# Patient Record
Sex: FEMALE | Race: WHITE | URBAN - METROPOLITAN AREA
[De-identification: names, ages, dates, MRNs, and addresses within clinical notes are randomized per-mention and may not be internally consistent; named-entity substitution may affect disease eponyms.]

---

## 2018-09-04 ENCOUNTER — APPOINTMENT (RX ONLY)
Dept: URBAN - METROPOLITAN AREA CLINIC 121 | Facility: CLINIC | Age: 83
Setting detail: DERMATOLOGY
End: 2018-09-04

## 2018-09-04 DIAGNOSIS — L57.0 ACTINIC KERATOSIS: ICD-10-CM

## 2018-09-04 DIAGNOSIS — Z71.89 OTHER SPECIFIED COUNSELING: ICD-10-CM

## 2018-09-04 DIAGNOSIS — D22 MELANOCYTIC NEVI: ICD-10-CM

## 2018-09-04 DIAGNOSIS — L82.1 OTHER SEBORRHEIC KERATOSIS: ICD-10-CM

## 2018-09-04 DIAGNOSIS — L72.0 EPIDERMAL CYST: ICD-10-CM

## 2018-09-04 PROBLEM — L29.8 OTHER PRURITUS: Status: ACTIVE | Noted: 2018-09-04

## 2018-09-04 PROBLEM — M12.9 ARTHROPATHY, UNSPECIFIED: Status: ACTIVE | Noted: 2018-09-04

## 2018-09-04 PROBLEM — D22.39 MELANOCYTIC NEVI OF OTHER PARTS OF FACE: Status: ACTIVE | Noted: 2018-09-04

## 2018-09-04 PROBLEM — E13.9 OTHER SPECIFIED DIABETES MELLITUS WITHOUT COMPLICATIONS: Status: ACTIVE | Noted: 2018-09-04

## 2018-09-04 PROBLEM — D22.4 MELANOCYTIC NEVI OF SCALP AND NECK: Status: ACTIVE | Noted: 2018-09-04

## 2018-09-04 PROCEDURE — ? COUNSELING

## 2018-09-04 PROCEDURE — ? DIAGNOSIS COMMENT

## 2018-09-04 PROCEDURE — 99202 OFFICE O/P NEW SF 15 MIN: CPT

## 2018-09-04 PROCEDURE — ? OTHER

## 2018-09-04 PROCEDURE — ? PRESCRIPTION

## 2018-09-04 RX ORDER — PHARMACY COMPOUNDING ACCESSORY
EACH MISCELLANEOUS
Qty: 30 | Refills: 0 | Status: ERX | COMMUNITY
Start: 2018-09-04

## 2018-09-04 RX ADMIN — Medication: at 13:53

## 2018-09-04 ASSESSMENT — LOCATION SIMPLE DESCRIPTION DERM
LOCATION SIMPLE: CHIN
LOCATION SIMPLE: RIGHT CLAVICULAR SKIN
LOCATION SIMPLE: RIGHT CHEEK
LOCATION SIMPLE: CHEST
LOCATION SIMPLE: LEFT ANTERIOR NECK

## 2018-09-04 ASSESSMENT — LOCATION DETAILED DESCRIPTION DERM
LOCATION DETAILED: LEFT SUPERIOR LATERAL NECK
LOCATION DETAILED: LEFT CHIN
LOCATION DETAILED: RIGHT CLAVICULAR SKIN
LOCATION DETAILED: RIGHT MEDIAL MALAR CHEEK
LOCATION DETAILED: LEFT LATERAL SUPERIOR CHEST

## 2018-09-04 ASSESSMENT — LOCATION ZONE DERM
LOCATION ZONE: TRUNK
LOCATION ZONE: NECK
LOCATION ZONE: FACE

## 2018-09-04 NOTE — PROCEDURE: DIAGNOSIS COMMENT
Detail Level: Simple
Comment: Pt was previously prescribed cephalexin 500MG by PCP pt states she has 1 pill left. Pt advised to finish medication.

## 2018-09-04 NOTE — PROCEDURE: OTHER
Note Text (......Xxx Chief Complaint.): This diagnosis correlates with the
Other (Free Text): 5FU handout was given.  9/4/18
Detail Level: Zone

## 2022-07-09 ENCOUNTER — TELEPHONE ENCOUNTER (OUTPATIENT)
Dept: URBAN - METROPOLITAN AREA CLINIC 121 | Facility: CLINIC | Age: 87
End: 2022-07-09

## 2022-07-10 ENCOUNTER — TELEPHONE ENCOUNTER (OUTPATIENT)
Dept: URBAN - METROPOLITAN AREA CLINIC 121 | Facility: CLINIC | Age: 87
End: 2022-07-10

## 2023-06-08 ENCOUNTER — TELEPHONE (OUTPATIENT)
Dept: PODIATRY | Facility: CLINIC | Age: 88
End: 2023-06-08
Payer: MEDICARE

## 2023-06-08 NOTE — TELEPHONE ENCOUNTER
Tried to contact patient the number is a non working number she doesn't have [facility] verbal release for me to call any other numbers. I am mailing a copy of the new appointment to the address listed in the chart.

## 2025-04-14 ENCOUNTER — APPOINTMENT (OUTPATIENT)
Dept: CT IMAGING | Facility: HOSPITAL | Age: OVER 89
DRG: 570 | End: 2025-04-14
Payer: MEDICARE

## 2025-04-14 ENCOUNTER — HOSPITAL ENCOUNTER (INPATIENT)
Facility: HOSPITAL | Age: OVER 89
LOS: 4 days | Discharge: NURSING, REHAB OR LONG-TERM CARE | DRG: 570 | End: 2025-04-19
Attending: EMERGENCY MEDICINE | Admitting: FAMILY MEDICINE
Payer: MEDICARE

## 2025-04-14 ENCOUNTER — APPOINTMENT (OUTPATIENT)
Dept: GENERAL RADIOLOGY | Facility: HOSPITAL | Age: OVER 89
DRG: 570 | End: 2025-04-14
Payer: MEDICARE

## 2025-04-14 DIAGNOSIS — Z74.09 IMPAIRED MOBILITY: ICD-10-CM

## 2025-04-14 DIAGNOSIS — L03.317 CELLULITIS OF BUTTOCK: Primary | ICD-10-CM

## 2025-04-14 DIAGNOSIS — S31.000A WOUND OF SACRAL REGION: ICD-10-CM

## 2025-04-14 DIAGNOSIS — I48.91 ATRIAL FIBRILLATION WITH RAPID VENTRICULAR RESPONSE: ICD-10-CM

## 2025-04-14 DIAGNOSIS — E87.1 HYPONATREMIA: ICD-10-CM

## 2025-04-14 LAB
ALBUMIN SERPL-MCNC: 3.8 G/DL (ref 3.5–5.2)
ALBUMIN/GLOB SERPL: 1.1 G/DL
ALP SERPL-CCNC: 88 U/L (ref 39–117)
ALT SERPL W P-5'-P-CCNC: 18 U/L (ref 1–33)
ANION GAP SERPL CALCULATED.3IONS-SCNC: 13 MMOL/L (ref 5–15)
AST SERPL-CCNC: 25 U/L (ref 1–32)
BACTERIA UR QL AUTO: ABNORMAL /HPF
BASOPHILS # BLD AUTO: 0.06 10*3/MM3 (ref 0–0.2)
BASOPHILS NFR BLD AUTO: 0.3 % (ref 0–1.5)
BILIRUB SERPL-MCNC: 0.7 MG/DL (ref 0–1.2)
BILIRUB UR QL STRIP: NEGATIVE
BUN SERPL-MCNC: 15 MG/DL (ref 8–23)
BUN/CREAT SERPL: 30.6 (ref 7–25)
CALCIUM SPEC-SCNC: 9.2 MG/DL (ref 8.2–9.6)
CHLORIDE SERPL-SCNC: 93 MMOL/L (ref 98–107)
CK SERPL-CCNC: 104 U/L (ref 20–180)
CLARITY UR: CLEAR
CO2 SERPL-SCNC: 23 MMOL/L (ref 22–29)
COLOR UR: YELLOW
CREAT SERPL-MCNC: 0.49 MG/DL (ref 0.57–1)
D-LACTATE SERPL-SCNC: 1.7 MMOL/L (ref 0.5–2)
DEPRECATED RDW RBC AUTO: 43.9 FL (ref 37–54)
EGFRCR SERPLBLD CKD-EPI 2021: 86.4 ML/MIN/1.73
EOSINOPHIL # BLD AUTO: 0.01 10*3/MM3 (ref 0–0.4)
EOSINOPHIL NFR BLD AUTO: 0.1 % (ref 0.3–6.2)
ERYTHROCYTE [DISTWIDTH] IN BLOOD BY AUTOMATED COUNT: 13.1 % (ref 12.3–15.4)
GLOBULIN UR ELPH-MCNC: 3.6 GM/DL
GLUCOSE SERPL-MCNC: 178 MG/DL (ref 65–99)
GLUCOSE UR STRIP-MCNC: NEGATIVE MG/DL
HCT VFR BLD AUTO: 42.7 % (ref 34–46.6)
HGB BLD-MCNC: 13.8 G/DL (ref 12–15.9)
HGB UR QL STRIP.AUTO: ABNORMAL
HOLD SPECIMEN: NORMAL
HYALINE CASTS UR QL AUTO: ABNORMAL /LPF
IMM GRANULOCYTES # BLD AUTO: 0.15 10*3/MM3 (ref 0–0.05)
IMM GRANULOCYTES NFR BLD AUTO: 0.8 % (ref 0–0.5)
INR PPP: 1.25 (ref 0.91–1.09)
KETONES UR QL STRIP: ABNORMAL
LEUKOCYTE ESTERASE UR QL STRIP.AUTO: NEGATIVE
LYMPHOCYTES # BLD AUTO: 2.29 10*3/MM3 (ref 0.7–3.1)
LYMPHOCYTES NFR BLD AUTO: 11.5 % (ref 19.6–45.3)
MCH RBC QN AUTO: 29.6 PG (ref 26.6–33)
MCHC RBC AUTO-ENTMCNC: 32.3 G/DL (ref 31.5–35.7)
MCV RBC AUTO: 91.4 FL (ref 79–97)
MONOCYTES # BLD AUTO: 1.23 10*3/MM3 (ref 0.1–0.9)
MONOCYTES NFR BLD AUTO: 6.2 % (ref 5–12)
NEUTROPHILS NFR BLD AUTO: 16.13 10*3/MM3 (ref 1.7–7)
NEUTROPHILS NFR BLD AUTO: 81.1 % (ref 42.7–76)
NITRITE UR QL STRIP: NEGATIVE
NRBC BLD AUTO-RTO: 0 /100 WBC (ref 0–0.2)
NT-PROBNP SERPL-MCNC: 3486 PG/ML (ref 0–1800)
PH UR STRIP.AUTO: 7 [PH] (ref 5–8)
PLATELET # BLD AUTO: 181 10*3/MM3 (ref 140–450)
PMV BLD AUTO: 9.8 FL (ref 6–12)
POTASSIUM SERPL-SCNC: 4.6 MMOL/L (ref 3.5–5.2)
PROT SERPL-MCNC: 7.4 G/DL (ref 6–8.5)
PROT UR QL STRIP: ABNORMAL
PROTHROMBIN TIME: 16.3 SECONDS (ref 11.8–14.8)
RBC # BLD AUTO: 4.67 10*6/MM3 (ref 3.77–5.28)
RBC # UR STRIP: ABNORMAL /HPF
REF LAB TEST METHOD: ABNORMAL
SODIUM SERPL-SCNC: 129 MMOL/L (ref 136–145)
SP GR UR STRIP: 1.02 (ref 1–1.03)
SQUAMOUS #/AREA URNS HPF: ABNORMAL /HPF
UROBILINOGEN UR QL STRIP: ABNORMAL
WBC # UR STRIP: ABNORMAL /HPF
WBC NRBC COR # BLD AUTO: 19.87 10*3/MM3 (ref 3.4–10.8)
WHOLE BLOOD HOLD COAG: NORMAL
WHOLE BLOOD HOLD SPECIMEN: NORMAL

## 2025-04-14 PROCEDURE — 93005 ELECTROCARDIOGRAM TRACING: CPT | Performed by: EMERGENCY MEDICINE

## 2025-04-14 PROCEDURE — 82550 ASSAY OF CK (CPK): CPT | Performed by: EMERGENCY MEDICINE

## 2025-04-14 PROCEDURE — 25010000002 PIPERACILLIN SOD-TAZOBACTAM PER 1 G: Performed by: EMERGENCY MEDICINE

## 2025-04-14 PROCEDURE — P9612 CATHETERIZE FOR URINE SPEC: HCPCS

## 2025-04-14 PROCEDURE — 72125 CT NECK SPINE W/O DYE: CPT

## 2025-04-14 PROCEDURE — 85610 PROTHROMBIN TIME: CPT | Performed by: EMERGENCY MEDICINE

## 2025-04-14 PROCEDURE — 83880 ASSAY OF NATRIURETIC PEPTIDE: CPT | Performed by: EMERGENCY MEDICINE

## 2025-04-14 PROCEDURE — 70450 CT HEAD/BRAIN W/O DYE: CPT

## 2025-04-14 PROCEDURE — 36415 COLL VENOUS BLD VENIPUNCTURE: CPT

## 2025-04-14 PROCEDURE — 25810000003 SODIUM CHLORIDE 0.9 % SOLUTION: Performed by: EMERGENCY MEDICINE

## 2025-04-14 PROCEDURE — 85025 COMPLETE CBC W/AUTO DIFF WBC: CPT | Performed by: EMERGENCY MEDICINE

## 2025-04-14 PROCEDURE — 99285 EMERGENCY DEPT VISIT HI MDM: CPT

## 2025-04-14 PROCEDURE — 81001 URINALYSIS AUTO W/SCOPE: CPT | Performed by: EMERGENCY MEDICINE

## 2025-04-14 PROCEDURE — 25010000002 MORPHINE PER 10 MG: Performed by: EMERGENCY MEDICINE

## 2025-04-14 PROCEDURE — 87040 BLOOD CULTURE FOR BACTERIA: CPT | Performed by: EMERGENCY MEDICINE

## 2025-04-14 PROCEDURE — 86140 C-REACTIVE PROTEIN: CPT

## 2025-04-14 PROCEDURE — 71045 X-RAY EXAM CHEST 1 VIEW: CPT

## 2025-04-14 PROCEDURE — 83605 ASSAY OF LACTIC ACID: CPT | Performed by: EMERGENCY MEDICINE

## 2025-04-14 PROCEDURE — 93010 ELECTROCARDIOGRAM REPORT: CPT | Performed by: INTERNAL MEDICINE

## 2025-04-14 PROCEDURE — 80053 COMPREHEN METABOLIC PANEL: CPT | Performed by: EMERGENCY MEDICINE

## 2025-04-14 RX ORDER — CITALOPRAM HYDROBROMIDE 10 MG/1
10 TABLET ORAL DAILY
COMMUNITY

## 2025-04-14 RX ORDER — SODIUM CHLORIDE 0.9 % (FLUSH) 0.9 %
10 SYRINGE (ML) INJECTION AS NEEDED
Status: DISCONTINUED | OUTPATIENT
Start: 2025-04-14 | End: 2025-04-18

## 2025-04-14 RX ORDER — TORSEMIDE 10 MG/1
5 TABLET ORAL DAILY
COMMUNITY

## 2025-04-14 RX ORDER — CLONIDINE HYDROCHLORIDE 0.2 MG/1
0.2 TABLET ORAL
COMMUNITY
End: 2025-04-19 | Stop reason: HOSPADM

## 2025-04-14 RX ORDER — TRAMADOL HYDROCHLORIDE 50 MG/1
50 TABLET ORAL EVERY 6 HOURS PRN
Status: ON HOLD | COMMUNITY
End: 2025-04-19

## 2025-04-14 RX ORDER — DORZOLAMIDE HYDROCHLORIDE AND TIMOLOL MALEATE 20; 5 MG/ML; MG/ML
1 SOLUTION/ DROPS OPHTHALMIC 2 TIMES DAILY
COMMUNITY

## 2025-04-14 RX ORDER — LOSARTAN POTASSIUM 50 MG/1
50 TABLET ORAL DAILY
COMMUNITY

## 2025-04-14 RX ADMIN — SODIUM CHLORIDE 2409 ML: 9 INJECTION, SOLUTION INTRAVENOUS at 23:29

## 2025-04-14 RX ADMIN — PIPERACILLIN AND TAZOBACTAM 3.38 G: 3; .375 INJECTION, POWDER, FOR SOLUTION INTRAVENOUS; PARENTERAL at 23:40

## 2025-04-14 RX ADMIN — MORPHINE SULFATE 4 MG: 4 INJECTION, SOLUTION INTRAMUSCULAR; INTRAVENOUS at 23:28

## 2025-04-14 NOTE — Clinical Note
Level of Care: Remote Telemetry [26]   Diagnosis: Cellulitis [[id number]]   Admitting Physician: NAIN MENDEZ [[id number]]   Attending Physician: NAIN MENDEZ [[id number]]   Certification: I Certify That Inpatient Hospital Services Are Medically Necessary For Greater Than 2 Midnights

## 2025-04-15 ENCOUNTER — APPOINTMENT (OUTPATIENT)
Dept: CARDIOLOGY | Facility: HOSPITAL | Age: OVER 89
DRG: 570 | End: 2025-04-15
Payer: MEDICARE

## 2025-04-15 ENCOUNTER — ANESTHESIA EVENT (OUTPATIENT)
Dept: PERIOP | Facility: HOSPITAL | Age: OVER 89
End: 2025-04-15
Payer: MEDICARE

## 2025-04-15 ENCOUNTER — ANESTHESIA (OUTPATIENT)
Dept: PERIOP | Facility: HOSPITAL | Age: OVER 89
End: 2025-04-15
Payer: MEDICARE

## 2025-04-15 PROBLEM — L03.90 CELLULITIS: Status: ACTIVE | Noted: 2025-04-15

## 2025-04-15 LAB
ANION GAP SERPL CALCULATED.3IONS-SCNC: 13 MMOL/L (ref 5–15)
AORTIC DIMENSIONLESS INDEX: 0.38 (DI)
AV MEAN PRESS GRAD SYS DOP V1V2: 4.6 MMHG
AV VMAX SYS DOP: 144.8 CM/SEC
BH CV ECHO MEAS - AO MAX PG: 8.4 MMHG
BH CV ECHO MEAS - AO ROOT DIAM: 3.6 CM
BH CV ECHO MEAS - AO V2 VTI: 26.2 CM
BH CV ECHO MEAS - AVA(I,D): 1.41 CM2
BH CV ECHO MEAS - EDV(CUBED): 88.4 ML
BH CV ECHO MEAS - EDV(MOD-SP4): 66.7 ML
BH CV ECHO MEAS - EF(MOD-SP4): 59.1 %
BH CV ECHO MEAS - ESV(CUBED): 35.3 ML
BH CV ECHO MEAS - ESV(MOD-SP4): 27.3 ML
BH CV ECHO MEAS - FS: 26.3 %
BH CV ECHO MEAS - IVS/LVPW: 1.22 CM
BH CV ECHO MEAS - IVSD: 1.08 CM
BH CV ECHO MEAS - LA DIMENSION: 3.5 CM
BH CV ECHO MEAS - LAT PEAK E' VEL: 9.3 CM/SEC
BH CV ECHO MEAS - LV DIASTOLIC VOL/BSA (35-75): 35 CM2
BH CV ECHO MEAS - LV MASS(C)D: 146.4 GRAMS
BH CV ECHO MEAS - LV MAX PG: 1.37 MMHG
BH CV ECHO MEAS - LV MEAN PG: 0.66 MMHG
BH CV ECHO MEAS - LV SYSTOLIC VOL/BSA (12-30): 14.3 CM2
BH CV ECHO MEAS - LV V1 MAX: 58.3 CM/SEC
BH CV ECHO MEAS - LV V1 VTI: 10 CM
BH CV ECHO MEAS - LVIDD: 4.5 CM
BH CV ECHO MEAS - LVIDS: 3.3 CM
BH CV ECHO MEAS - LVOT AREA: 3.7 CM2
BH CV ECHO MEAS - LVOT DIAM: 2.17 CM
BH CV ECHO MEAS - LVPWD: 0.88 CM
BH CV ECHO MEAS - MED PEAK E' VEL: 7.7 CM/SEC
BH CV ECHO MEAS - MV DEC SLOPE: 951.9 CM/SEC2
BH CV ECHO MEAS - MV DEC TIME: 0.12 SEC
BH CV ECHO MEAS - MV E MAX VEL: 115.5 CM/SEC
BH CV ECHO MEAS - PA V2 MAX: 95.7 CM/SEC
BH CV ECHO MEAS - RAP SYSTOLE: 3 MMHG
BH CV ECHO MEAS - RVDD: 3.3 CM
BH CV ECHO MEAS - RVSP: 37.8 MMHG
BH CV ECHO MEAS - SV(LVOT): 37 ML
BH CV ECHO MEAS - SV(MOD-SP4): 39.4 ML
BH CV ECHO MEAS - SVI(LVOT): 19.4 ML/M2
BH CV ECHO MEAS - SVI(MOD-SP4): 20.7 ML/M2
BH CV ECHO MEAS - TAPSE (>1.6): 1.44 CM
BH CV ECHO MEAS - TR MAX PG: 34.8 MMHG
BH CV ECHO MEAS - TR MAX VEL: 295 CM/SEC
BH CV ECHO MEASUREMENTS AVERAGE E/E' RATIO: 13.59
BUN SERPL-MCNC: 13 MG/DL (ref 8–23)
BUN/CREAT SERPL: 31 (ref 7–25)
CALCIUM SPEC-SCNC: 8.7 MG/DL (ref 8.2–9.6)
CHLORIDE SERPL-SCNC: 98 MMOL/L (ref 98–107)
CO2 SERPL-SCNC: 23 MMOL/L (ref 22–29)
CREAT SERPL-MCNC: 0.42 MG/DL (ref 0.57–1)
CRP SERPL-MCNC: 14.37 MG/DL (ref 0–0.5)
DEPRECATED RDW RBC AUTO: 45 FL (ref 37–54)
EGFRCR SERPLBLD CKD-EPI 2021: 89.6 ML/MIN/1.73
ERYTHROCYTE [DISTWIDTH] IN BLOOD BY AUTOMATED COUNT: 13.2 % (ref 12.3–15.4)
ERYTHROCYTE [SEDIMENTATION RATE] IN BLOOD: 27 MM/HR (ref 0–30)
GLUCOSE BLDC GLUCOMTR-MCNC: 179 MG/DL (ref 70–130)
GLUCOSE SERPL-MCNC: 156 MG/DL (ref 65–99)
HCT VFR BLD AUTO: 40.1 % (ref 34–46.6)
HGB BLD-MCNC: 12.8 G/DL (ref 12–15.9)
MAGNESIUM SERPL-MCNC: 1.8 MG/DL (ref 1.7–2.3)
MCH RBC QN AUTO: 29.8 PG (ref 26.6–33)
MCHC RBC AUTO-ENTMCNC: 31.9 G/DL (ref 31.5–35.7)
MCV RBC AUTO: 93.3 FL (ref 79–97)
MRSA DNA SPEC QL NAA+PROBE: NORMAL
PLATELET # BLD AUTO: 163 10*3/MM3 (ref 140–450)
PMV BLD AUTO: 10.4 FL (ref 6–12)
POTASSIUM SERPL-SCNC: 3.6 MMOL/L (ref 3.5–5.2)
RBC # BLD AUTO: 4.3 10*6/MM3 (ref 3.77–5.28)
SODIUM SERPL-SCNC: 134 MMOL/L (ref 136–145)
TSH SERPL DL<=0.05 MIU/L-ACNC: 0.83 UIU/ML (ref 0.27–4.2)
WBC NRBC COR # BLD AUTO: 19.16 10*3/MM3 (ref 3.4–10.8)

## 2025-04-15 PROCEDURE — 25010000002 PIPERACILLIN SOD-TAZOBACTAM PER 1 G

## 2025-04-15 PROCEDURE — 25810000003 LACTATED RINGERS PER 1000 ML: Performed by: GENERAL PRACTICE

## 2025-04-15 PROCEDURE — 25010000002 LIDOCAINE PF 2% 2 % SOLUTION: Performed by: NURSE ANESTHETIST, CERTIFIED REGISTERED

## 2025-04-15 PROCEDURE — 93306 TTE W/DOPPLER COMPLETE: CPT | Performed by: INTERNAL MEDICINE

## 2025-04-15 PROCEDURE — 25010000002 FUROSEMIDE PER 20 MG: Performed by: NURSE PRACTITIONER

## 2025-04-15 PROCEDURE — 36415 COLL VENOUS BLD VENIPUNCTURE: CPT

## 2025-04-15 PROCEDURE — 93356 MYOCRD STRAIN IMG SPCKL TRCK: CPT

## 2025-04-15 PROCEDURE — 87641 MR-STAPH DNA AMP PROBE: CPT

## 2025-04-15 PROCEDURE — 80048 BASIC METABOLIC PNL TOTAL CA: CPT

## 2025-04-15 PROCEDURE — 25010000002 PROPOFOL 10 MG/ML EMULSION: Performed by: NURSE ANESTHETIST, CERTIFIED REGISTERED

## 2025-04-15 PROCEDURE — 87102 FUNGUS ISOLATION CULTURE: CPT | Performed by: GENERAL PRACTICE

## 2025-04-15 PROCEDURE — 93306 TTE W/DOPPLER COMPLETE: CPT

## 2025-04-15 PROCEDURE — 25010000002 LIDOCAINE 1% - EPINEPHRINE 1:100000 1 %-1:100000 SOLUTION: Performed by: GENERAL PRACTICE

## 2025-04-15 PROCEDURE — 85652 RBC SED RATE AUTOMATED: CPT

## 2025-04-15 PROCEDURE — 25010000002 FUROSEMIDE PER 20 MG: Performed by: GENERAL PRACTICE

## 2025-04-15 PROCEDURE — 11042 DBRDMT SUBQ TIS 1ST 20SQCM/<: CPT | Performed by: GENERAL PRACTICE

## 2025-04-15 PROCEDURE — 25010000002 FUROSEMIDE PER 20 MG: Performed by: INTERNAL MEDICINE

## 2025-04-15 PROCEDURE — 25010000002 METHYLPREDNISOLONE PER 125 MG: Performed by: INTERNAL MEDICINE

## 2025-04-15 PROCEDURE — 83735 ASSAY OF MAGNESIUM: CPT | Performed by: NURSE PRACTITIONER

## 2025-04-15 PROCEDURE — 82948 REAGENT STRIP/BLOOD GLUCOSE: CPT

## 2025-04-15 PROCEDURE — 85027 COMPLETE CBC AUTOMATED: CPT

## 2025-04-15 PROCEDURE — 25010000002 ENOXAPARIN PER 10 MG: Performed by: INTERNAL MEDICINE

## 2025-04-15 PROCEDURE — 25010000002 CEFAZOLIN PER 500 MG: Performed by: GENERAL PRACTICE

## 2025-04-15 PROCEDURE — 87070 CULTURE OTHR SPECIMN AEROBIC: CPT | Performed by: GENERAL PRACTICE

## 2025-04-15 PROCEDURE — 87075 CULTR BACTERIA EXCEPT BLOOD: CPT | Performed by: GENERAL PRACTICE

## 2025-04-15 PROCEDURE — 99221 1ST HOSP IP/OBS SF/LOW 40: CPT | Performed by: GENERAL PRACTICE

## 2025-04-15 PROCEDURE — 87186 SC STD MICRODIL/AGAR DIL: CPT | Performed by: GENERAL PRACTICE

## 2025-04-15 PROCEDURE — 87205 SMEAR GRAM STAIN: CPT | Performed by: GENERAL PRACTICE

## 2025-04-15 PROCEDURE — 25010000002 CEFAZOLIN PER 500 MG: Performed by: INTERNAL MEDICINE

## 2025-04-15 PROCEDURE — 0JB90ZZ EXCISION OF BUTTOCK SUBCUTANEOUS TISSUE AND FASCIA, OPEN APPROACH: ICD-10-PCS | Performed by: GENERAL PRACTICE

## 2025-04-15 PROCEDURE — 84443 ASSAY THYROID STIM HORMONE: CPT

## 2025-04-15 PROCEDURE — 99222 1ST HOSP IP/OBS MODERATE 55: CPT | Performed by: NURSE PRACTITIONER

## 2025-04-15 RX ORDER — SODIUM CHLORIDE 0.9 % (FLUSH) 0.9 %
10 SYRINGE (ML) INJECTION EVERY 12 HOURS SCHEDULED
Status: DISCONTINUED | OUTPATIENT
Start: 2025-04-15 | End: 2025-04-19 | Stop reason: HOSPADM

## 2025-04-15 RX ORDER — SODIUM CHLORIDE, SODIUM LACTATE, POTASSIUM CHLORIDE, CALCIUM CHLORIDE 600; 310; 30; 20 MG/100ML; MG/100ML; MG/100ML; MG/100ML
20 INJECTION, SOLUTION INTRAVENOUS CONTINUOUS
Status: DISCONTINUED | OUTPATIENT
Start: 2025-04-15 | End: 2025-04-15

## 2025-04-15 RX ORDER — HEPARIN SODIUM 5000 [USP'U]/ML
5000 INJECTION, SOLUTION INTRAVENOUS; SUBCUTANEOUS EVERY 12 HOURS SCHEDULED
Status: CANCELLED | OUTPATIENT
Start: 2025-04-15

## 2025-04-15 RX ORDER — ACETAMINOPHEN 160 MG/5ML
650 SOLUTION ORAL EVERY 4 HOURS PRN
Status: DISCONTINUED | OUTPATIENT
Start: 2025-04-15 | End: 2025-04-19 | Stop reason: HOSPADM

## 2025-04-15 RX ORDER — HYDROMORPHONE HYDROCHLORIDE 1 MG/ML
0.5 INJECTION, SOLUTION INTRAMUSCULAR; INTRAVENOUS; SUBCUTANEOUS
Status: DISCONTINUED | OUTPATIENT
Start: 2025-04-15 | End: 2025-04-15 | Stop reason: HOSPADM

## 2025-04-15 RX ORDER — SODIUM CHLORIDE 9 MG/ML
40 INJECTION, SOLUTION INTRAVENOUS AS NEEDED
Status: DISCONTINUED | OUTPATIENT
Start: 2025-04-15 | End: 2025-04-19 | Stop reason: HOSPADM

## 2025-04-15 RX ORDER — PROPOFOL 10 MG/ML
VIAL (ML) INTRAVENOUS AS NEEDED
Status: DISCONTINUED | OUTPATIENT
Start: 2025-04-15 | End: 2025-04-15 | Stop reason: SURG

## 2025-04-15 RX ORDER — OXYCODONE AND ACETAMINOPHEN 10; 325 MG/1; MG/1
1 TABLET ORAL EVERY 4 HOURS PRN
Status: DISCONTINUED | OUTPATIENT
Start: 2025-04-15 | End: 2025-04-15 | Stop reason: HOSPADM

## 2025-04-15 RX ORDER — FENTANYL CITRATE 50 UG/ML
50 INJECTION, SOLUTION INTRAMUSCULAR; INTRAVENOUS
Status: DISCONTINUED | OUTPATIENT
Start: 2025-04-15 | End: 2025-04-15 | Stop reason: HOSPADM

## 2025-04-15 RX ORDER — NALOXONE HCL 0.4 MG/ML
0.04 VIAL (ML) INJECTION AS NEEDED
Status: DISCONTINUED | OUTPATIENT
Start: 2025-04-15 | End: 2025-04-15 | Stop reason: HOSPADM

## 2025-04-15 RX ORDER — METOPROLOL TARTRATE 1 MG/ML
5 INJECTION, SOLUTION INTRAVENOUS ONCE AS NEEDED
Status: COMPLETED | OUTPATIENT
Start: 2025-04-15 | End: 2025-04-15

## 2025-04-15 RX ORDER — ACETAMINOPHEN 650 MG/1
650 SUPPOSITORY RECTAL EVERY 4 HOURS PRN
Status: DISCONTINUED | OUTPATIENT
Start: 2025-04-15 | End: 2025-04-19 | Stop reason: HOSPADM

## 2025-04-15 RX ORDER — MAGNESIUM HYDROXIDE 1200 MG/15ML
LIQUID ORAL AS NEEDED
Status: DISCONTINUED | OUTPATIENT
Start: 2025-04-15 | End: 2025-04-15 | Stop reason: HOSPADM

## 2025-04-15 RX ORDER — METHYLPREDNISOLONE SODIUM SUCCINATE 125 MG/2ML
125 INJECTION, POWDER, LYOPHILIZED, FOR SOLUTION INTRAMUSCULAR; INTRAVENOUS ONCE
Status: COMPLETED | OUTPATIENT
Start: 2025-04-15 | End: 2025-04-15

## 2025-04-15 RX ORDER — BISACODYL 5 MG/1
5 TABLET, DELAYED RELEASE ORAL DAILY PRN
Status: DISCONTINUED | OUTPATIENT
Start: 2025-04-15 | End: 2025-04-19 | Stop reason: HOSPADM

## 2025-04-15 RX ORDER — LOSARTAN POTASSIUM 25 MG/1
25 TABLET ORAL
Status: DISCONTINUED | OUTPATIENT
Start: 2025-04-15 | End: 2025-04-16

## 2025-04-15 RX ORDER — METOPROLOL TARTRATE 1 MG/ML
5 INJECTION, SOLUTION INTRAVENOUS ONCE
Status: COMPLETED | OUTPATIENT
Start: 2025-04-15 | End: 2025-04-15

## 2025-04-15 RX ORDER — ENOXAPARIN SODIUM 100 MG/ML
40 INJECTION SUBCUTANEOUS EVERY 24 HOURS
Status: DISCONTINUED | OUTPATIENT
Start: 2025-04-15 | End: 2025-04-19 | Stop reason: HOSPADM

## 2025-04-15 RX ORDER — NITROGLYCERIN 0.4 MG/1
0.4 TABLET SUBLINGUAL
Status: DISCONTINUED | OUTPATIENT
Start: 2025-04-15 | End: 2025-04-19 | Stop reason: HOSPADM

## 2025-04-15 RX ORDER — FUROSEMIDE 10 MG/ML
40 INJECTION INTRAMUSCULAR; INTRAVENOUS
Status: DISCONTINUED | OUTPATIENT
Start: 2025-04-15 | End: 2025-04-16

## 2025-04-15 RX ORDER — SODIUM CHLORIDE 0.9 % (FLUSH) 0.9 %
10 SYRINGE (ML) INJECTION AS NEEDED
Status: DISCONTINUED | OUTPATIENT
Start: 2025-04-15 | End: 2025-04-19 | Stop reason: HOSPADM

## 2025-04-15 RX ORDER — POLYETHYLENE GLYCOL 3350 17 G/17G
17 POWDER, FOR SOLUTION ORAL DAILY PRN
Status: DISCONTINUED | OUTPATIENT
Start: 2025-04-15 | End: 2025-04-19 | Stop reason: HOSPADM

## 2025-04-15 RX ORDER — LIDOCAINE HYDROCHLORIDE AND EPINEPHRINE 10; 10 MG/ML; UG/ML
INJECTION, SOLUTION INFILTRATION; PERINEURAL AS NEEDED
Status: DISCONTINUED | OUTPATIENT
Start: 2025-04-15 | End: 2025-04-15 | Stop reason: HOSPADM

## 2025-04-15 RX ORDER — AMOXICILLIN 250 MG
2 CAPSULE ORAL 2 TIMES DAILY PRN
Status: DISCONTINUED | OUTPATIENT
Start: 2025-04-15 | End: 2025-04-19 | Stop reason: HOSPADM

## 2025-04-15 RX ORDER — SODIUM CHLORIDE 9 MG/ML
100 INJECTION, SOLUTION INTRAVENOUS CONTINUOUS
Status: DISCONTINUED | OUTPATIENT
Start: 2025-04-15 | End: 2025-04-15

## 2025-04-15 RX ORDER — BISACODYL 10 MG
10 SUPPOSITORY, RECTAL RECTAL DAILY PRN
Status: DISCONTINUED | OUTPATIENT
Start: 2025-04-15 | End: 2025-04-19 | Stop reason: HOSPADM

## 2025-04-15 RX ORDER — METOPROLOL TARTRATE 25 MG/1
12.5 TABLET, FILM COATED ORAL EVERY 12 HOURS SCHEDULED
Status: DISCONTINUED | OUTPATIENT
Start: 2025-04-15 | End: 2025-04-16

## 2025-04-15 RX ORDER — FUROSEMIDE 10 MG/ML
40 INJECTION INTRAMUSCULAR; INTRAVENOUS ONCE
Status: DISCONTINUED | OUTPATIENT
Start: 2025-04-15 | End: 2025-04-15

## 2025-04-15 RX ORDER — LIDOCAINE HYDROCHLORIDE 20 MG/ML
INJECTION, SOLUTION EPIDURAL; INFILTRATION; INTRACAUDAL; PERINEURAL AS NEEDED
Status: DISCONTINUED | OUTPATIENT
Start: 2025-04-15 | End: 2025-04-15 | Stop reason: SURG

## 2025-04-15 RX ORDER — FLUMAZENIL 0.1 MG/ML
0.2 INJECTION INTRAVENOUS AS NEEDED
Status: DISCONTINUED | OUTPATIENT
Start: 2025-04-15 | End: 2025-04-15 | Stop reason: HOSPADM

## 2025-04-15 RX ORDER — CITALOPRAM HYDROBROMIDE 20 MG/1
10 TABLET ORAL DAILY
Status: DISCONTINUED | OUTPATIENT
Start: 2025-04-15 | End: 2025-04-19 | Stop reason: HOSPADM

## 2025-04-15 RX ORDER — TRAMADOL HYDROCHLORIDE 50 MG/1
50 TABLET ORAL ONCE AS NEEDED
Status: COMPLETED | OUTPATIENT
Start: 2025-04-15 | End: 2025-04-16

## 2025-04-15 RX ORDER — ONDANSETRON 2 MG/ML
4 INJECTION INTRAMUSCULAR; INTRAVENOUS
Status: DISCONTINUED | OUTPATIENT
Start: 2025-04-15 | End: 2025-04-15 | Stop reason: HOSPADM

## 2025-04-15 RX ORDER — ACETAMINOPHEN 325 MG/1
650 TABLET ORAL EVERY 4 HOURS PRN
Status: DISCONTINUED | OUTPATIENT
Start: 2025-04-15 | End: 2025-04-19 | Stop reason: HOSPADM

## 2025-04-15 RX ORDER — LABETALOL HYDROCHLORIDE 5 MG/ML
5 INJECTION, SOLUTION INTRAVENOUS
Status: DISCONTINUED | OUTPATIENT
Start: 2025-04-15 | End: 2025-04-15 | Stop reason: HOSPADM

## 2025-04-15 RX ADMIN — FUROSEMIDE 40 MG: 10 INJECTION, SOLUTION INTRAMUSCULAR; INTRAVENOUS at 10:03

## 2025-04-15 RX ADMIN — PROPOFOL 50 MG: 10 INJECTION, EMULSION INTRAVENOUS at 13:18

## 2025-04-15 RX ADMIN — METOPROLOL TARTRATE 5 MG: 5 INJECTION INTRAVENOUS at 07:07

## 2025-04-15 RX ADMIN — CITALOPRAM HYDROBROMIDE 10 MG: 20 TABLET ORAL at 09:21

## 2025-04-15 RX ADMIN — METHYLPREDNISOLONE SODIUM SUCCINATE 125 MG: 125 INJECTION, POWDER, FOR SOLUTION INTRAMUSCULAR; INTRAVENOUS at 10:43

## 2025-04-15 RX ADMIN — PROPOFOL 75 MG: 10 INJECTION, EMULSION INTRAVENOUS at 13:14

## 2025-04-15 RX ADMIN — PROPOFOL 50 MG: 10 INJECTION, EMULSION INTRAVENOUS at 13:35

## 2025-04-15 RX ADMIN — PROPOFOL 25 MG: 10 INJECTION, EMULSION INTRAVENOUS at 13:28

## 2025-04-15 RX ADMIN — ENOXAPARIN SODIUM 40 MG: 100 INJECTION SUBCUTANEOUS at 15:14

## 2025-04-15 RX ADMIN — METOPROLOL TARTRATE 5 MG: 5 INJECTION INTRAVENOUS at 00:19

## 2025-04-15 RX ADMIN — METOPROLOL TARTRATE 12.5 MG: 25 TABLET, FILM COATED ORAL at 09:21

## 2025-04-15 RX ADMIN — LIDOCAINE HYDROCHLORIDE 100 MG: 20 INJECTION, SOLUTION EPIDURAL; INFILTRATION; INTRACAUDAL; PERINEURAL at 13:14

## 2025-04-15 RX ADMIN — CEFAZOLIN 2000 MG: 2 INJECTION, POWDER, FOR SOLUTION INTRAMUSCULAR; INTRAVENOUS at 18:24

## 2025-04-15 RX ADMIN — METOPROLOL TARTRATE 12.5 MG: 25 TABLET, FILM COATED ORAL at 20:23

## 2025-04-15 RX ADMIN — ACETAMINOPHEN 650 MG: 325 TABLET, FILM COATED ORAL at 05:03

## 2025-04-15 RX ADMIN — FUROSEMIDE 40 MG: 10 INJECTION, SOLUTION INTRAMUSCULAR; INTRAVENOUS at 10:43

## 2025-04-15 RX ADMIN — LOSARTAN POTASSIUM 25 MG: 25 TABLET, FILM COATED ORAL at 15:31

## 2025-04-15 RX ADMIN — CEFAZOLIN 2000 MG: 2 INJECTION, POWDER, FOR SOLUTION INTRAMUSCULAR; INTRAVENOUS at 10:07

## 2025-04-15 RX ADMIN — FUROSEMIDE 40 MG: 10 INJECTION, SOLUTION INTRAMUSCULAR; INTRAVENOUS at 18:24

## 2025-04-15 RX ADMIN — ACETAMINOPHEN 650 MG: 325 TABLET, FILM COATED ORAL at 18:23

## 2025-04-15 RX ADMIN — PROPOFOL 50 MG: 10 INJECTION, EMULSION INTRAVENOUS at 13:21

## 2025-04-15 RX ADMIN — SODIUM CHLORIDE, POTASSIUM CHLORIDE, SODIUM LACTATE AND CALCIUM CHLORIDE 20 ML/HR: 600; 310; 30; 20 INJECTION, SOLUTION INTRAVENOUS at 13:02

## 2025-04-15 RX ADMIN — PIPERACILLIN AND TAZOBACTAM 3.38 G: 3; .375 INJECTION, POWDER, FOR SOLUTION INTRAVENOUS; PARENTERAL at 07:08

## 2025-04-15 RX ADMIN — METOPROLOL TARTRATE 5 MG: 5 INJECTION INTRAVENOUS at 01:11

## 2025-04-15 NOTE — ED PROVIDER NOTES
"EMERGENCY DEPARTMENT ATTENDING NOTE    Patient Name: BRANDON Melchor    Chief Complaint   Patient presents with    Neck Pain    Weakness - Generalized    Urinary Retention       PATIENT PRESENTATION:  BRANDON Melchor is a 89 y.o. female with PMH significant for atrial fibrillation, hypertension, hyperlipidemia, diabetes, glaucoma who presents to the ED with a large right gluteal cellulitis and possible abscess.  Patient unable to provide significant history of wound.  Family is at bedside and it is the patient's son and his wife.  They report that the patient lives with her daughter-in-law's parents.  Was down this afternoon in the bathroom an unknown amount of time.  Possible loss of consciousness complaining of headache and neck pain.  Patient was also seen by her PCM due to a large necrotic lesion in her right buttocks.      PHYSICAL EXAM:   VS: /88   Pulse 100 Comment: a-fib  Temp 99.2 °F (37.3 °C) (Oral)   Resp 24   Ht 167.6 cm (66\")   Wt 80.3 kg (177 lb)   SpO2 95%   BMI 28.57 kg/m²   GENERAL: elderly, uncomfortable, poor dentition   EYES: PERRL, sclerae anicteric, extraocular movements grossly intact, symmetric lids  EARS, NOSE, MOUTH, THROAT: atraumatic external nose and ears, moist mucous membranes  NECK: symmetric, trachea midline  RESPIRATORY: unlabored respiratory effort, clear to auscultation bilaterally, good air movement  CARDIOVASCULAR: no murmurs, peripheral pulses 2+ and equal in all extremities, regular tachycardic, no significant lower extremity edema  GI: soft, nontender, nondistended  MUSCULOSKELETAL/EXTREMITIES: extremities without obvious deformity  SKIN: Large necrotic eschar with surrounding erythema on the right buttocks, image placed in the patient's chart  NEUROLOGIC: moving all 4 extremities symmetrically, CN II-XII grossly intact  PSYCHIATRIC: alert, pleasant and cooperative. Appropriate mood and affect.      MEDICAL DECISION MAKING:    BRANDON" Jodie Melchor is a 89 y.o. female who presented to the ED with atrial fibrillation with rapid ventricular response, a necrotic eschar and possible infection after a fall with unknown downtime with headache and neck pain  Procedures    Differential Diagnosis Considered: Intracranial hemorrhage, cervical spine injury, rhabdomyolysis, cellulitis, sepsis, A-fib with RVR    Labs Ordered:  Labs Reviewed   COMPREHENSIVE METABOLIC PANEL - Abnormal; Notable for the following components:       Result Value    Glucose 178 (*)     Creatinine 0.49 (*)     Sodium 129 (*)     Chloride 93 (*)     BUN/Creatinine Ratio 30.6 (*)     All other components within normal limits    Narrative:     GFR Categories in Chronic Kidney Disease (CKD)      GFR Category          GFR (mL/min/1.73)    Interpretation  G1                     90 or greater         Normal or high (1)  G2                      60-89                Mild decrease (1)  G3a                   45-59                Mild to moderate decrease  G3b                   30-44                Moderate to severe decrease  G4                    15-29                Severe decrease  G5                    14 or less           Kidney failure          (1)In the absence of evidence of kidney disease, neither GFR category G1 or G2 fulfill the criteria for CKD.    eGFR calculation 2021 CKD-EPI creatinine equation, which does not include race as a factor   PROTIME-INR - Abnormal; Notable for the following components:    Protime 16.3 (*)     INR 1.25 (*)     All other components within normal limits   URINALYSIS W/ CULTURE IF INDICATED - Abnormal; Notable for the following components:    Ketones, UA Trace (*)     Blood, UA Small (1+) (*)     Protein,  mg/dL (2+) (*)     All other components within normal limits    Narrative:     In absence of clinical symptoms, the presence of pyuria, bacteria, and/or nitrites on the urinalysis result does not correlate with infection.   BNP (IN-HOUSE) -  Abnormal; Notable for the following components:    proBNP 3,486.0 (*)     All other components within normal limits    Narrative:     This assay is used as an aid in the diagnosis of individuals suspected of having heart failure. It can be used as an aid in the diagnosis of acute decompensated heart failure (ADHF) in patients presenting with signs and symptoms of ADHF to the emergency department (ED). In addition, NT-proBNP of <300 pg/mL indicates ADHF is not likely.    Age Range Result Interpretation  NT-proBNP Concentration (pg/mL:      <50             Positive            >450                   Gray                 300-450                    Negative             <300    50-75           Positive            >900                  Gray                300-900                  Negative            <300      >75             Positive            >1800                  Gray                300-1800                  Negative            <300   CBC WITH AUTO DIFFERENTIAL - Abnormal; Notable for the following components:    WBC 19.87 (*)     Neutrophil % 81.1 (*)     Lymphocyte % 11.5 (*)     Eosinophil % 0.1 (*)     Immature Grans % 0.8 (*)     Neutrophils, Absolute 16.13 (*)     Monocytes, Absolute 1.23 (*)     Immature Grans, Absolute 0.15 (*)     All other components within normal limits   URINALYSIS, MICROSCOPIC ONLY - Abnormal; Notable for the following components:    RBC, UA 11-20 (*)     All other components within normal limits   C-REACTIVE PROTEIN - Abnormal; Notable for the following components:    C-Reactive Protein 14.37 (*)     All other components within normal limits   MRSA SCREEN, PCR - Normal    Narrative:     The negative predictive value of this diagnostic test is high and should only be used to consider de-escalating anti-MRSA therapy. A positive result may indicate colonization with MRSA and must be correlated clinically.   LACTIC ACID, PLASMA - Normal   CK - Normal   BLOOD CULTURE   BLOOD CULTURE   RAINBOW  DRAW    Narrative:     The following orders were created for panel order [place] Draw.  Procedure                               Abnormality         Status                     ---------                               -----------         ------                     Green Top (Gel)[[id number]]                                  Final result               Lavender Top[[id number]]                                     Final result               Red Top[[id number]]                                          Final result               Galeas Top[[id number]]                                         Final result               Light Blue Top[[id number]]                                   Final result                 Please view results for these tests on the individual orders.   SEDIMENTATION RATE   TSH   CBC (NO DIFF)   BASIC METABOLIC PANEL   CBC AND DIFFERENTIAL    Narrative:     The following orders were created for panel order CBC & Differential.  Procedure                               Abnormality         Status                     ---------                               -----------         ------                     CBC Auto Differential[[id number]]        Abnormal            Final result                 Please view results for these tests on the individual orders.   GREEN TOP   LAVENDER TOP   RED TOP   GRAY TOP   LIGHT BLUE TOP        Imaging Ordered:   XR Chest 1 View   Final Result   1.. Elevated right diaphragm with right basilar atelectasis. Lungs are   otherwise clear.       This report was signed and finalized on 4/14/2025 9:40 PM by Dr. Stevenson Spears MD.          CT Head Without Contrast    (Results Pending)   CT Cervical Spine Without Contrast    (Results Pending)       Internal chart review:   Past Medical History:   Diagnosis Date    A-fib     Diabetes mellitus     Glaucoma     Hyperlipidemia     Hypertension        Past Surgical History:   Procedure Laterality Date    HYSTERECTOMY      KNEE SURGERY Right     TOE SURGERY  Right        Allergies   Allergen Reactions    Milk-Related Compounds Hives         Current Facility-Administered Medications:     acetaminophen (TYLENOL) tablet 650 mg, 650 mg, Oral, Q4H PRN **OR** acetaminophen (TYLENOL) 160 MG/5ML oral solution 650 mg, 650 mg, Oral, Q4H PRN **OR** acetaminophen (TYLENOL) suppository 650 mg, 650 mg, Rectal, Q4H PRN, Forrest Sanchez MD    sennosides-docusate (PERICOLACE) 8.6-50 MG per tablet 2 tablet, 2 tablet, Oral, BID PRN **AND** polyethylene glycol (MIRALAX) packet 17 g, 17 g, Oral, Daily PRN **AND** bisacodyl (DULCOLAX) EC tablet 5 mg, 5 mg, Oral, Daily PRN **AND** bisacodyl (DULCOLAX) suppository 10 mg, 10 mg, Rectal, Daily PRN, Forrest Sanchez MD    citalopram (CeleXA) tablet 10 mg, 10 mg, Oral, Daily, Forrest Sanchez MD    metoprolol tartrate (LOPRESSOR) tablet 12.5 mg, 12.5 mg, Oral, Q12H, Forrest Sanchez MD    nitroglycerin (NITROSTAT) SL tablet 0.4 mg, 0.4 mg, Sublingual, Q5 Min PRN, Forrest Sanchez MD    Pharmacy To Dose: Piperacillin-tazobactam (Zosyn), , Not Applicable, Continuous PRN, Forrest Sanchez MD    piperacillin-tazobactam (ZOSYN) 3.375 g IVPB in 100 mL NS MBP (CD), 3.375 g, Intravenous, Q8H, Forrest Sanchez MD    sodium chloride 0.9 % flush 10 mL, 10 mL, Intravenous, PRNToñito Joseph, MD    sodium chloride 0.9 % flush 10 mL, 10 mL, Intravenous, Q12H, Forrest Sanchez MD    sodium chloride 0.9 % flush 10 mL, 10 mL, Intravenous, PRNDaniel Rami H, MD    sodium chloride 0.9 % infusion 40 mL, 40 mL, Intravenous, PRNDaniel Rami H, MD    sodium chloride 0.9 % infusion, 100 mL/hr, Intravenous, Continuous, Forrest Sanchez MD    External documents reviewed: Unable to view patient's appointments with her PCM today and [place]    My EKG interpretation: EKG atrial fibrillation with rapid ventricular response and rate of 117, otherwise normal intervals and no signs of ischemia or arrhythmia.    My lab interpretation: See below    My imaging interpretation: No acute  process on CT imaging    Discussed with: Patient and son    ED Course and Re-evaluation:     ED Course as of 04/15/25 0416   Mon Apr 14, 2025   2250 Urine with microscopic hematuria and catheterized specimen. [JJ]   2253 Chest x-ray without acute process. [JJ]   2253 Elevated BNP and no prior comparison present. [JJ]   2308 Holding rate control due to history of A-fib and suspect patient's tachycardia secondary to pain and infection.  Plan for imaging, antibiotics and general surgery consultation. [JJ]   Tue Apr 15, 2025   0005 Lactate within normal limits, lower suspicion for sepsis.  Will treat for A-fib with RVR and cellulitis. [JJ]   0047 CT head and C-spine without acute process. [JJ]   0111 Will discuss patient's chronic wound with general surgery given concern for possible abscess with surrounding cellulitis and will likely require debridement.  Given that she showed up in A-fib with RVR and concerns for sepsis will discuss possible hospitalist admission on IV antibiotics and general surgery to evaluate in the morning. [JJ]   0115 Discussed with surgery on-call Dr. Costa who recommends admission IV antibiotics and surgery will see the patient in the morning. [JJ]   0127 Discussed with Dr. Sanchez patient admitted on IV antibiotics to remote telemetry for her A-fib with RVR and soft tissue infection. [JJ]      ED Course User Index  [JJ] Chaitanya Sibley MD        ED Critical Care time:     ED Diagnosis:  (L03.317) Cellulitis of buttock    (I48.91) Atrial fibrillation with rapid ventricular response    (E87.1) Hyponatremia     Disposition: Admitted for IV antibiotics and general surgery consultation in the morning.      Signed:  Chaitanya Sibley MD  Emergency Medicine Physician    Please note that portions of this note were completed with a voice recognition program.      Chaitanya Sibley MD  04/15/25 0417

## 2025-04-15 NOTE — OP NOTE
DEBRIDEMENT SACRAL ULCER/WOUND  Procedure Note    BRANDON Melchor  4/15/2025    Pre-op Diagnosis:   * No pre-op diagnosis entered *    Post-op Diagnosis:     * No Diagnosis Codes entered *    Procedure/CPT® Codes:  No CPT Code Applied in Case Entry    Procedure(s):  INCISION AND DEBRIDEMENT SACRAL WOUND    Surgeon(s):  Dav Trevino MD  Assistant: Meeta Mcclure APRN  was responsible for performing the following activities: Retraction, Suction, and Irrigation and their skilled assistance was necessary for the success of this case.    Anesthesia: General    Staff:   Specimens       ID Source Type Tests Collected By Collected At Frozen?    1 Sacrum Surgical Site ANAEROBIC CULTURE  FUNGAL CULTURE  WOUND CULTURE   Dav Trevino MD 4/15/25 1329     Description: SACRAL WOUND SWAB            Estimated Blood Loss: minimal    Specimens:                ID Type Source Tests Collected by Time   1 : SACRAL WOUND SWAB Surgical Site Sacrum ANAEROBIC CULTURE, FUNGAL CULTURE, WOUND CULTURE Dav Trevino MD 4/15/2025 1329         Drains:   External Urinary Catheter (Active)   Site Assessment Other (Comment) 04/15/25 1346   Output (mL) 500 mL 04/15/25 1054       Findings: Sacral wound with underlying necrotic subcutaneous tissue status post excisional debridement with scissors to the level of fascia. Wound opening 3 cm x 4 cm, underlying wound approximately 7 cm x 5 cm x 3 cm     Complications: None    Procedure: Prior to the operation the patient was met in the preoperative holding area where she was again explained risk benefits and alternatives of the procedure.  Patient stated her understanding, consent was signed, the patient was met by the anesthesia care team and taken to the operating room where she was placed supine on the operating table with proper padding of all extremities.  She underwent MAC anesthesia and was placed in left lateral decubitus position.  She was prepped and draped in usual sterile  manner and a timeout was performed to confirm correct patient, procedure, operating site.    The wound in question was inspected, there was noted to be a 3 cm x 3 cm area of necrotic skin with purulent material able to be expressed.  This skin area was excised and the underlying purulent material was cultured.  The base of the wound was noted to have necrotic subcutaneous tissue which was debrided sharply down to the level of the fascia.  The inside of the wound was noted to be 7 cm x 5 cm x 3 cm deep.  Once all the necrotic tissue was removed it was irrigated copiously.  Hemostasis was obtained.  Kerlix was placed into the wound and an ABD pad was placed over top.  The patient awoke from general anesthesia in stable condition and was transferred to the PACU.  Sponge and instrument counts were correct at the conclusion of the case.          Dav Trevino MD     Date: 4/15/2025  Time: 14:09 CDT    Part of this note may be an electronic transcription/translation of spoken language to printed text using the Dragon Dictation System.

## 2025-04-15 NOTE — PROGRESS NOTES
[facility] Medicine Services  INPATIENT PROGRESS NOTE    Patient Name: BRANDON Melchor  Date of Admission: 4/14/2025  Today's Date: 04/15/25  Length of Stay: 0  Primary Care Physician: Reginaldo Uribe MD    Subjective   Chief Complaint: Generalized weakness    Patient was in acute respiratory distress this morning during my visit, physical exam showed bilateral crackles and wheeze.  I gave patient stat Solu-Medrol and IV furosemide.      Review of Systems   All pertinent negatives and positives are as above. All other systems have been reviewed and are negative unless otherwise stated.     Objective    Temp:  [97.4 °F (36.3 °C)-99.6 °F (37.6 °C)] 99.6 °F (37.6 °C)  Heart Rate:  [] 106  Resp:  [17-28] 18  BP: (123-164)/(66-99) 152/96  Physical Exam  GEN: Awake, alert,  uncomfortable and in respiratory distress  HEENT: Atraumatic, PERRLA, EOMI, Anicteric, Trachea midline  Lungs: Bilateral crackles with wheeze  Heart: RRR, +S1/s2, no rub  ABD: soft, nt/nd, +BS, no guarding/rebound  Extremities: atraumatic, no cyanosis, no edema  Groin: Patient has a right gluteal necrotic wound with induration and abscess drainage.  Skin: no rashes or lesions  Neuro: AAOx3, no focal deficits  Psych: normal mood & affect       Results Review:  I have reviewed the labs, radiology results, and diagnostic studies.    Laboratory Data:   Results from last 7 days   Lab Units 04/15/25  0407 04/14/25 2129   WBC 10*3/mm3 19.16* 19.87*   HEMOGLOBIN g/dL 12.8 13.8   HEMATOCRIT % 40.1 42.7   PLATELETS 10*3/mm3 163 181        Results from last 7 days   Lab Units 04/15/25  0416 04/14/25 2129   SODIUM mmol/L 134* 129*   POTASSIUM mmol/L 3.6 4.6   CHLORIDE mmol/L 98 93*   CO2 mmol/L 23.0 23.0   BUN mg/dL 13 15   CREATININE mg/dL 0.42* 0.49*   CALCIUM mg/dL 8.7 9.2   BILIRUBIN mg/dL  --  0.7   ALK PHOS U/L  --  88   ALT (SGPT) U/L  --  18   AST (SGOT) U/L  --  25   GLUCOSE mg/dL 156* 178*  "      Culture Data:   No results found for: \"BLOODCX\", \"URINECX\", \"WOUNDCX\", \"MRSACX\", \"RESPCX\", \"STOOLCX\"    Radiology Data:   Imaging Results (Last 24 Hours)       Procedure Component Value Units Date/Time    CT Cervical Spine Without Contrast [[id number]] Collected: 04/15/25 0710     Updated: 04/15/25 0718    Narrative:      EXAMINATION:  CT CERVICAL SPINE WO CONTRAST-  4/14/2025 10:15 PM     HISTORY: Fall with unknown loss consciousness. Rule out fracture.     COMPARISON: No comparison.      DOSE LENGTH PRODUCT: 894.33 mGy.cm Automated exposure control was also  utilized to decrease patient radiation dose.     TECHNIQUE: Serial helical tomographic images of the cervical spine were  obtained without the use of intravenous contrast. Sagittal and coronal  reformatted images were also provided.     FINDINGS:     ALIGNMENT AND ADDITIONAL FINDINGS: There is 2 to 3 mm of anterior  subluxation of C4 compared to C5, degenerative. The alignment is  otherwise normal.     BONES: There is no evidence of fracture. Vertebral body heights are  maintained.     DISC SPACES:     C2-3: There is mild disc narrowing. There is a central disc osteophyte  complex producing minimal dural sac compression. There is facet  arthropathy right greater than left. There is no significant spinal or  foraminal narrowing.     C3-4: There is moderate disc narrowing. There is spondylitic and  uncinate spurring. There is severe uncinate spurring on the right. There  is severe facet arthropathy on the right and mild to moderate facet  arthropathy on the left. There is severe right-sided foraminal  narrowing. There is mild foraminal narrowing on the left.     C4-5: The disc is fairly well maintained in height. There is spondylitic  and uncinate spurring. There is moderate to severe facet arthropathy.  There is severe right and moderate to severe left-sided foraminal  narrowing.     C5-6: There is minimal disc narrowing. There is mild uncinate " spurring.  There is mild to moderate facet arthropathy. There is mild to moderate  foraminal narrowing on the right.     C6-7: There is severe disc narrowing. There is spondylitic and uncinate  spurring. There is mild facet arthropathy left greater than right. There  is severe bilateral foraminal narrowing.     C7-T1: The disc is maintained in height. There is mild facet arthropathy  bilaterally. There is no significant spinal or foraminal narrowing.     T1-2: There is severe disc narrowing. There is mild to moderate facet  arthropathy. There is spondylitic and uncinate spurring. There is  moderate to severe foraminal narrowing right greater than left.          Impression:      1. No evidence of cervical spine fracture.  2. Degenerative changes, as described.           This report was signed and finalized on 4/15/2025 7:15 AM by Dr. Rosendo Natarajan MD.       CT Head Without Contrast [[id number]] Collected: 04/15/25 0656     Updated: 04/15/25 0703    Narrative:      EXAM/TECHNIQUE: CT head without contrast     INDICATION: Fall with unknown loss consciousness     COMPARISON: None available.     DLP: 894.33 mGy.cm. Automated exposure control was utilized to decrease  patient radiation dose.     FINDINGS:     No evidence of intracranial hemorrhage. Gray-white differentiation is  maintained. Global cerebral volume loss and presumed chronic  microvascular ischemic white matter change. No midline shift or mass  effect. Lateral ventricles are nondilated. Basilar cisterns are patent.  No acute orbital finding. Mastoid air cells are clear. Visualized  paranasal sinuses are clear. No acute osseous finding.       Impression:         1. No acute intracranial findings.  2. Global cerebral volume loss and presumed chronic microvascular  ischemic white matter change.        These findings are in agreement with the critical and emergent findings  from the [facility] preliminary report.     This report was signed and finalized on  4/15/2025 7:00 AM by Dr. Mick Owens MD.       XR Chest 1 View [[id number]] Collected: 04/14/25 2138     Updated: 04/14/25 2143    Narrative:      EXAMINATION: XR CHEST 1 VW-  4/14/2025 9:39 PM     HISTORY: Shortness of breath. Edema.     FINDINGS: Upright frontal projection of the chest demonstrates elevation  of the right diaphragm with right basilar atelectasis. Lungs are  otherwise clear. No evidence of cardiac enlargement or vascular  redistribution to suggest congestive change. There is no effusion or  free air present.       Impression:      1.. Elevated right diaphragm with right basilar atelectasis. Lungs are  otherwise clear.     This report was signed and finalized on 4/14/2025 9:40 PM by Dr. Stevenson Spears MD.               I have reviewed the patient's current medications.     Assessment/Plan   Assessment  Active Hospital Problems    Diagnosis     **Cellulitis        Treatment Plan    -Acute hypoxic respiratory failure secondary to CHF unknown type exacerbation  Patient was in severe respiratory distress when I visited this morning, she was fluid resuscitated in the emergency room because of suspected sepsis.  She was also wheezing during my visit.  Start furosemide and Solu-Medrol were ordered.  She is currently needing 2 L of oxygen posttreatment.    -Probable congestive heart failure of unknown type  Patient's proBNP was elevated on admission, echocardiogram was ordered and still pending.  Will start patient empirically on furosemide and restart home losartan, she was started on Coreg on admission for rapid ventricular rate.  Cardiology was also consulted and helping with management.    -Right gluteal abscess/cellulitis  General Surgery was consulted and patient is status post surgery which was reported as follows-  Procedure(s):  Excisional debridement of sacral wound to level of fascia.  She was started on Zosyn from the emergency room, switched antibiotics to cefazolin.  We will follow  surgical cultures    - A-fib with RVR on presentation  Patient was started on low-dose metoprolol from the emergency room, patient's rate is fair but not well-controlled yet.  Cardiology was consulted and helping with management.    Other chronic medical conditions-  Diabetes mellitus  Hypertension  Hyperlipidemia    DVT prophylaxis-will start Lovenox    Electronically signed by Abdelrahman Mohamud MD, 04/15/25, 14:21 CDT.

## 2025-04-15 NOTE — CONSULTS
"[facility] HEART GROUP CONSULT NOTE    Referring Provider: Dr. Sanchez    Reason for Consultation: \"a fib with RVR\"    Chief Complaint   Patient presents with    Neck Pain    Weakness - Generalized    Urinary Retention       Subjective .     History of present illness:  BRANDON Melchor is a 89 y.o. female who presented to the emergency department for evaluation of generalized weakness after being found down at home and complains of pain in her right buttocks.  She was found to have right buttocks wound concerning for infection.  Workup in the emergency department also revealed atrial fibrillation with rapid ventricular response.  She was administered IV fluids due to positive sepsis screen.  She was admitted overnight and cardiology consultation was placed due to the findings of atrial fibrillation as well as recommendations regarding anticoagulation.    She is currently alone without family.  She was found down at home for an undetermined amount of time.  She lives with family at home.  Upon assessment this morning she is labored and reports abdominal discomfort that she relates to her increased respiratory effort.  She denies any palpitations, chest pain, chest pressure.  She tells me she has not felt like this before.  In regards to her cardiac history she tells me she takes losartan in the mornings at home.  It is uncertain regarding the chronicity of her atrial fibrillation or details surrounding this.  Home medication list does not include anticoagulation.    She had improvement of rate control yesterday with the administration of IV Lopressor.  She was started on low-dose oral metoprolol as well.  Rates have varied overnight.  She is mildly tachycardic this morning but is also visibly uncomfortable with increased respiratory effort.      History  Past Medical History:   Diagnosis Date    A-fib     Diabetes mellitus     Glaucoma     Hyperlipidemia     Hypertension    ,   Past Surgical " History:   Procedure Laterality Date    HYSTERECTOMY      KNEE SURGERY Right     TOE SURGERY Right    ,   History reviewed. No pertinent family history.,   Social History     Tobacco Use    Smoking status: Former     Current packs/day: 0.00     Types: Cigarettes     Quit date:      Years since quittin.3     Passive exposure: Past    Smokeless tobacco: Never   Vaping Use    Vaping status: Never Used   Substance Use Topics    Alcohol use: Not Currently    Drug use: Never   ,     Medications  Current Facility-Administered Medications   Medication Dose Route Frequency Provider Last Rate Last Admin    acetaminophen (TYLENOL) tablet 650 mg  650 mg Oral Q4H PRN Forrest Sanchez MD   650 mg at 04/15/25 0503    Or    acetaminophen (TYLENOL) 160 MG/5ML oral solution 650 mg  650 mg Oral Q4H PRN Forrest Sanchez MD        Or    acetaminophen (TYLENOL) suppository 650 mg  650 mg Rectal Q4H PRN Forrest Sanchez MD        sennosides-docusate (PERICOLACE) 8.6-50 MG per tablet 2 tablet  2 tablet Oral BID PRN Forrest Sanchez MD        And    polyethylene glycol (MIRALAX) packet 17 g  17 g Oral Daily PRN Forrest Sanchez MD        And    bisacodyl (DULCOLAX) EC tablet 5 mg  5 mg Oral Daily PRN Forrest Sanchez MD        And    bisacodyl (DULCOLAX) suppository 10 mg  10 mg Rectal Daily PRN Forrest Sanchez MD        ceFAZolin 2000 mg IVPB in 100 mL NS (MBP)  2,000 mg Intravenous Q8H Abdelrahman Mohamud MD        citalopram (CeleXA) tablet 10 mg  10 mg Oral Daily Forrest Sanchez MD        metoprolol tartrate (LOPRESSOR) tablet 12.5 mg  12.5 mg Oral Q12H Forrest Sanchez MD        nitroglycerin (NITROSTAT) SL tablet 0.4 mg  0.4 mg Sublingual Q5 Min PRN Forrest Sanchez MD        Pharmacy To Dose: Piperacillin-tazobactam (Zosyn)   Not Applicable Continuous PRN Forrest Sanchez MD        sodium chloride 0.9 % flush 10 mL  10 mL Intravenous PRN Chaitanya Sibley MD        sodium chloride 0.9 % flush 10 mL  10 mL Intravenous Q12H Forrest Sanchez  "MD        sodium chloride 0.9 % flush 10 mL  10 mL Intravenous PRN Forrest Sanchez MD        sodium chloride 0.9 % infusion 40 mL  40 mL Intravenous PRN Forrest Sanchez MD        sodium chloride 0.9 % infusion  100 mL/hr Intravenous Continuous Forrest Sanchez MD           Allergies:  Milk-related compounds    Review of Systems  Review of Systems   Constitutional: Negative for fever.   Cardiovascular:  Positive for leg swelling. Negative for chest pain.   Respiratory:  Positive for shortness of breath. Negative for cough, sputum production and wheezing.    Hematologic/Lymphatic: Bruises/bleeds easily.   Musculoskeletal:  Positive for falls and muscle weakness.   Gastrointestinal:  Positive for abdominal pain.   Neurological:  Positive for weakness.   Psychiatric/Behavioral:  The patient is nervous/anxious.        Objective     Physical Exam:  Patient Vitals for the past 24 hrs:   BP Temp Temp src Pulse Resp SpO2 Height Weight   04/15/25 0744 147/98 97.8 °F (36.6 °C) Oral 92 28 95 % -- --   04/15/25 0707 -- -- -- (!) 121 -- -- -- --   04/15/25 0500 156/66 97.4 °F (36.3 °C) Oral 106 20 96 % -- 80.9 kg (178 lb 5.6 oz)   04/15/25 0405 -- -- -- -- -- -- -- 80.9 kg (178 lb 5.6 oz)   04/15/25 0205 144/88 99.2 °F (37.3 °C) Oral 100 -- 95 % -- --   04/15/25 0111 123/70 -- -- 96 -- -- -- --   04/15/25 0101 123/70 98 °F (36.7 °C) -- 91 24 95 % -- --   04/15/25 0020 145/87 -- -- 108 24 95 % -- --   04/14/25 2330 142/78 -- -- (!) 131 24 96 % -- --   04/14/25 2201 137/91 -- -- 120 20 96 % -- --   04/14/25 2110 142/99 98 °F (36.7 °C) -- 117 20 98 % 167.6 cm (66\") 80.3 kg (177 lb)     Vitals reviewed.   Constitutional:       General: Awake.      Appearance: Well-developed, well-groomed and overweight. Ill-appearing and acutely ill-appearing.      Interventions: Nasal cannula in place.   HENT:      Head: Normocephalic and atraumatic.   Pulmonary:      Effort: Tachypnea present.      Breath sounds: No wheezing. Bilateral to the " midchest Rales present.   Cardiovascular:      Tachycardia present. Irregularly irregular rhythm.   Edema:     Pretibial: bilateral edema of the pretibial area.     Ankle: bilateral edema of the ankle.  Musculoskeletal:      Cervical back: Normal range of motion and neck supple. Skin:     General: Skin is warm and dry.   Neurological:      Mental Status: Alert, oriented to person, place, and time and oriented to person, place and time.   Psychiatric:         Mood and Affect: Mood is anxious.         Speech: Speech normal.         Behavior: Behavior normal. Behavior is cooperative.         Thought Content: Thought content normal.         Results Review:   I reviewed the patient's new clinical results.    Lab Results (last 72 hours)       Procedure Component Value Units Date/Time    TSH [[id number]]  (Normal) Collected: 04/15/25 0416    Specimen: Blood Updated: 04/15/25 0527     TSH 0.826 uIU/mL     Basic Metabolic Panel [[id number]]  (Abnormal) Collected: 04/15/25 0416    Specimen: Blood Updated: 04/15/25 0527     Glucose 156 mg/dL      BUN 13 mg/dL      Creatinine 0.42 mg/dL      Sodium 134 mmol/L      Potassium 3.6 mmol/L      Chloride 98 mmol/L      CO2 23.0 mmol/L      Calcium 8.7 mg/dL      BUN/Creatinine Ratio 31.0     Anion Gap 13.0 mmol/L      eGFR 89.6 mL/min/1.73     Narrative:      GFR Categories in Chronic Kidney Disease (CKD)      GFR Category          GFR (mL/min/1.73)    Interpretation  G1                     90 or greater         Normal or high (1)  G2                      60-89                Mild decrease (1)  G3a                   45-59                Mild to moderate decrease  G3b                   30-44                Moderate to severe decrease  G4                    15-29                Severe decrease  G5                    14 or less           Kidney failure          (1)In the absence of evidence of kidney disease, neither GFR category G1 or G2 fulfill the criteria for CKD.    eGFR calculation  2021 CKD-EPI creatinine equation, which does not include race as a factor    Sedimentation Rate [[id number]]  (Normal) Collected: 04/15/25 0407    Specimen: Blood Updated: 04/15/25 0512     Sed Rate 27 mm/hr     CBC (No Diff) [[id number]]  (Abnormal) Collected: 04/15/25 0407    Specimen: Blood Updated: 04/15/25 0458     WBC 19.16 10*3/mm3      RBC 4.30 10*6/mm3      Hemoglobin 12.8 g/dL      Hematocrit 40.1 %      MCV 93.3 fL      MCH 29.8 pg      MCHC 31.9 g/dL      RDW 13.2 %      RDW-SD 45.0 fl      MPV 10.4 fL      Platelets 163 10*3/mm3     MRSA Screen, PCR (Inpatient) - Swab, Nares [[id number]]  (Normal) Collected: 04/15/25 0214    Specimen: Swab from Nares Updated: 04/15/25 0334     MRSA PCR No MRSA Detected    Narrative:      The negative predictive value of this diagnostic test is high and should only be used to consider de-escalating anti-MRSA therapy. A positive result may indicate colonization with MRSA and must be correlated clinically.    C-reactive Protein [[id number]]  (Abnormal) Collected: 04/14/25 2129    Specimen: Blood Updated: 04/15/25 0152     C-Reactive Protein 14.37 mg/dL     Blood Culture - Blood, Arm, Right [[id number]] Collected: 04/14/25 2340    Specimen: Blood from Arm, Right Updated: 04/14/25 2351    Blood Culture - Blood, Arm, Left [[id number]] Collected: 04/14/25 2335    Specimen: Blood from Arm, Left Updated: 04/14/25 2342    CK [[id number]]  (Normal) Collected: 04/14/25 2129    Specimen: Blood Updated: 04/14/25 2324     Creatine Kinase 104 U/L     Lactic Acid, Plasma [[id number]]  (Normal) Collected: 04/14/25 2129    Specimen: Blood Updated: 04/14/25 2321     Lactate 1.7 mmol/L     Comprehensive Metabolic Panel [[id number]]  (Abnormal) Collected: 04/14/25 2129    Specimen: Blood Updated: 04/14/25 2159     Glucose 178 mg/dL      BUN 15 mg/dL      Creatinine 0.49 mg/dL      Sodium 129 mmol/L      Potassium 4.6 mmol/L      Comment: Slight hemolysis detected by analyzer. Result may be falsely  elevated.        Chloride 93 mmol/L      CO2 23.0 mmol/L      Calcium 9.2 mg/dL      Total Protein 7.4 g/dL      Albumin 3.8 g/dL      ALT (SGPT) 18 U/L      AST (SGOT) 25 U/L      Comment: Slight hemolysis detected by analyzer. Result may be falsely elevated.        Alkaline Phosphatase 88 U/L      Total Bilirubin 0.7 mg/dL      Globulin 3.6 gm/dL      A/G Ratio 1.1 g/dL      BUN/Creatinine Ratio 30.6     Anion Gap 13.0 mmol/L      eGFR 86.4 mL/min/1.73     Narrative:      GFR Categories in Chronic Kidney Disease (CKD)      GFR Category          GFR (mL/min/1.73)    Interpretation  G1                     90 or greater         Normal or high (1)  G2                      60-89                Mild decrease (1)  G3a                   45-59                Mild to moderate decrease  G3b                   30-44                Moderate to severe decrease  G4                    15-29                Severe decrease  G5                    14 or less           Kidney failure          (1)In the absence of evidence of kidney disease, neither GFR category G1 or G2 fulfill the criteria for CKD.    eGFR calculation 2021 CKD-EPI creatinine equation, which does not include race as a factor    Urinalysis With Culture If Indicated - Straight Cath [[id number]]  (Abnormal) Collected: 04/14/25 2142    Specimen: Urine from Straight Cath Updated: 04/14/25 2156     Color, UA Yellow     Appearance, UA Clear     pH, UA 7.0     Specific Gravity, UA 1.017     Glucose, UA Negative     Ketones, UA Trace     Bilirubin, UA Negative     Blood, UA Small (1+)     Protein,  mg/dL (2+)     Leuk Esterase, UA Negative     Nitrite, UA Negative     Urobilinogen, UA 1.0 E.U./dL    Narrative:      In absence of clinical symptoms, the presence of pyuria, bacteria, and/or nitrites on the urinalysis result does not correlate with infection.    Urinalysis, Microscopic Only - Straight Cath [[id number]]  (Abnormal) Collected: 04/14/25 2142    Specimen: Urine  from Straight Cath Updated: 04/14/25 2156     RBC, UA 11-20 /HPF      WBC, UA 0-2 /HPF      Comment: Urine culture not indicated.        Bacteria, UA None Seen /HPF      Squamous Epithelial Cells, UA 0-2 /HPF      Hyaline Casts, UA None Seen /LPF      Methodology Automated Microscopy    BNP [[id number]]  (Abnormal) Collected: 04/14/25 2129    Specimen: Blood Updated: 04/14/25 2153     proBNP 3,486.0 pg/mL     Narrative:      This assay is used as an aid in the diagnosis of individuals suspected of having heart failure. It can be used as an aid in the diagnosis of acute decompensated heart failure (ADHF) in patients presenting with signs and symptoms of ADHF to the emergency department (ED). In addition, NT-proBNP of <300 pg/mL indicates ADHF is not likely.    Age Range Result Interpretation  NT-proBNP Concentration (pg/mL:      <50             Positive            >450                   Gray                 300-450                    Negative             <300    50-75           Positive            >900                  Gray                300-900                  Negative            <300      >75             Positive            >1800                  Gray                300-1800                  Negative            <300    Protime-INR [[id number]]  (Abnormal) Collected: 04/14/25 2129    Specimen: Blood Updated: 04/14/25 2145     Protime 16.3 Seconds      INR 1.25    CBC & Differential [[id number]]  (Abnormal) Collected: 04/14/25 2129    Specimen: Blood Updated: 04/14/25 2136    Narrative:      The following orders were created for panel order CBC & Differential.  Procedure                               Abnormality         Status                     ---------                               -----------         ------                     CBC Auto Differential[[id number]]        Abnormal            Final result                 Please view results for these tests on the individual orders.    CBC Auto Differential [[id number]]   (Abnormal) Collected: 04/14/25 2129    Specimen: Blood Updated: 04/14/25 2136     WBC 19.87 10*3/mm3      RBC 4.67 10*6/mm3      Hemoglobin 13.8 g/dL      Hematocrit 42.7 %      MCV 91.4 fL      MCH 29.6 pg      MCHC 32.3 g/dL      RDW 13.1 %      RDW-SD 43.9 fl      MPV 9.8 fL      Platelets 181 10*3/mm3      Neutrophil % 81.1 %      Lymphocyte % 11.5 %      Monocyte % 6.2 %      Eosinophil % 0.1 %      Basophil % 0.3 %      Immature Grans % 0.8 %      Neutrophils, Absolute 16.13 10*3/mm3      Lymphocytes, Absolute 2.29 10*3/mm3      Monocytes, Absolute 1.23 10*3/mm3      Eosinophils, Absolute 0.01 10*3/mm3      Basophils, Absolute 0.06 10*3/mm3      Immature Grans, Absolute 0.15 10*3/mm3      nRBC 0.0 /100 WBC                 Imaging Results (Last 72 Hours)       Procedure Component Value Units Date/Time    CT Cervical Spine Without Contrast [[id number]] Collected: 04/15/25 0710     Updated: 04/15/25 0718    Narrative:      EXAMINATION:  CT CERVICAL SPINE WO CONTRAST-  4/14/2025 10:15 PM     HISTORY: Fall with unknown loss consciousness. Rule out fracture.     COMPARISON: No comparison.      DOSE LENGTH PRODUCT: 894.33 mGy.cm Automated exposure control was also  utilized to decrease patient radiation dose.     TECHNIQUE: Serial helical tomographic images of the cervical spine were  obtained without the use of intravenous contrast. Sagittal and coronal  reformatted images were also provided.     FINDINGS:     ALIGNMENT AND ADDITIONAL FINDINGS: There is 2 to 3 mm of anterior  subluxation of C4 compared to C5, degenerative. The alignment is  otherwise normal.     BONES: There is no evidence of fracture. Vertebral body heights are  maintained.     DISC SPACES:     C2-3: There is mild disc narrowing. There is a central disc osteophyte  complex producing minimal dural sac compression. There is facet  arthropathy right greater than left. There is no significant spinal or  foraminal narrowing.     C3-4: There is moderate  disc narrowing. There is spondylitic and  uncinate spurring. There is severe uncinate spurring on the right. There  is severe facet arthropathy on the right and mild to moderate facet  arthropathy on the left. There is severe right-sided foraminal  narrowing. There is mild foraminal narrowing on the left.     C4-5: The disc is fairly well maintained in height. There is spondylitic  and uncinate spurring. There is moderate to severe facet arthropathy.  There is severe right and moderate to severe left-sided foraminal  narrowing.     C5-6: There is minimal disc narrowing. There is mild uncinate spurring.  There is mild to moderate facet arthropathy. There is mild to moderate  foraminal narrowing on the right.     C6-7: There is severe disc narrowing. There is spondylitic and uncinate  spurring. There is mild facet arthropathy left greater than right. There  is severe bilateral foraminal narrowing.     C7-T1: The disc is maintained in height. There is mild facet arthropathy  bilaterally. There is no significant spinal or foraminal narrowing.     T1-2: There is severe disc narrowing. There is mild to moderate facet  arthropathy. There is spondylitic and uncinate spurring. There is  moderate to severe foraminal narrowing right greater than left.          Impression:      1. No evidence of cervical spine fracture.  2. Degenerative changes, as described.           This report was signed and finalized on 4/15/2025 7:15 AM by Dr. Rosendo Natarajan MD.       CT Head Without Contrast [[id number]] Collected: 04/15/25 0656     Updated: 04/15/25 0703    Narrative:      EXAM/TECHNIQUE: CT head without contrast     INDICATION: Fall with unknown loss consciousness     COMPARISON: None available.     DLP: 894.33 mGy.cm. Automated exposure control was utilized to decrease  patient radiation dose.     FINDINGS:     No evidence of intracranial hemorrhage. Gray-white differentiation is  maintained. Global cerebral volume loss and presumed  chronic  microvascular ischemic white matter change. No midline shift or mass  effect. Lateral ventricles are nondilated. Basilar cisterns are patent.  No acute orbital finding. Mastoid air cells are clear. Visualized  paranasal sinuses are clear. No acute osseous finding.       Impression:         1. No acute intracranial findings.  2. Global cerebral volume loss and presumed chronic microvascular  ischemic white matter change.        These findings are in agreement with the critical and emergent findings  from the [facility] preliminary report.     This report was signed and finalized on 4/15/2025 7:00 AM by Dr. Mick Owens MD.       XR Chest 1 View [[id number]] Collected: 04/14/25 2138     Updated: 04/14/25 2143    Narrative:      EXAMINATION: XR CHEST 1 VW-  4/14/2025 9:39 PM     HISTORY: Shortness of breath. Edema.     FINDINGS: Upright frontal projection of the chest demonstrates elevation  of the right diaphragm with right basilar atelectasis. Lungs are  otherwise clear. No evidence of cardiac enlargement or vascular  redistribution to suggest congestive change. There is no effusion or  free air present.       Impression:      1.. Elevated right diaphragm with right basilar atelectasis. Lungs are  otherwise clear.     This report was signed and finalized on 4/14/2025 9:40 PM by Dr. Stevenson Spears MD.               Assessment     1.  Atrial fibrillation with rapid ventricular response: Unknown chronicity  2.  Acute hypoxic respiratory failure: on Supplemental oxygen  3.  Wound: buttocks, right   4.  Leukocytosis  5.  Hypertension: on home medication   6.  Weakness  7.  Fall Risk  8.  Advanced Age    Plan       Lasix 40 mg IV now  Resume home medication losartan 25 mg daily: We will resume at a lower dose given the addition of beta-blocker therapy for rate control which was added yesterday  Check magnesium    Cardiology was consulted due to findings of atrial fibrillation with rapid ventricular response  and recommendations regarding anticoagulation.  Patient's heart rate is mildly elevated this morning.  She is asymptomatic to her heart rate but complaining of increased shortness of breath.  She was administered 2500 mL of fluid for sepsis screening and based on exam appears to be volume overloaded with increased respiratory effort and rales.  Recommend 1 dose of IV diuretics and evaluate response.  Given leukocytosis, presentation with complaints of generalized weakness, advanced age, recent fall would recommend treatment of wound and concern for underlying infection.  Heart rates seem to be compensatory and blood pressure remained stable.  Would not significantly adjust beta-blocker therapy at this time.  An echocardiogram has been ordered by the primary service which may be helpful to provide further input on diuretic management after assessment of left ventricular ejection fraction and systolic function.  We will add magnesium to labs.    Recommend follow-up labs in the morning to reevaluate renal function.  Recommend evaluation by admitting physician due to increased respiratory effort complaints of abdominal pain which has been requested by nursing staff this morning.    We will follow up again tomorrow.       Electronically signed by AGUSTIN Bardales, 04/15/25, 9:09 AM CDT.      Please note this cardiology consultation note is the result of a face to face consultation with the patient, in addition to reviewing medical records at length by myself, AGSUTIN Bardales.       Time: 45 minutes

## 2025-04-15 NOTE — ED NOTES
Pharmacy Name: [facility] - [place], [place] - [address] [phone] [facility] [phone]      Pharmacy representative phone number: [phone]    What medication are you calling in regards to:   lisinopril-hydrochlorothiazide (PRINZIDE,ZESTORETIC) 20-12.5 MG per tablet  2 tablet, 2 Times Daily         What question does the pharmacy have: WILL THE PCP AUTHORIZE A 90 DAY SUPPLY     Who is the provider that prescribed the medication: JO ANN MATTSON     Additional notes: REFERENCE NUMBER FOR THE ORDER: [id number]     CONTACTED PT'S EMERGENCY CONTACT, REGARDING PT'S ROOM NUMBER 404.  LEFT A MESSAGE

## 2025-04-15 NOTE — PLAN OF CARE
Goal Outcome Evaluation:  Plan of Care Reviewed With: patient        Progress: improving  Outcome Evaluation: Patient given medication to treat FVO and respiratory distress this am with improvement noted. Surgical I&D performed to sacral wound. Abx administered. Transitioned to RA and maintaining O2 saturation. Awaiting wound care consult.

## 2025-04-15 NOTE — CASE MANAGEMENT/SOCIAL WORK
Continued Stay Note   [place]     Patient Name: BRANDON Melchor  MRN: [id number]  Today's Date: 4/15/2025    Admit Date: 4/14/2025        Discharge Plan       Row Name 04/15/25 1336       Plan    Patient/Family in Agreement with Plan unable to assess    Plan Comments Pt currently in OR; SW will screen when she returns to room.                   Discharge Codes    No documentation.                 Expected Discharge Date and Time       Expected Discharge Date Expected Discharge Time    Apr 18, 2025               GEORGE Paredes

## 2025-04-15 NOTE — PLAN OF CARE
Goal Outcome Evaluation:  Plan of Care Reviewed With: patient    Patient arrived on floor from ED, was very anxious, in afib on monitor 120-150,  called and got order for metoprolol iv to help with heart rate, patient O2 was 85-88 when moving onto bed and rolling for assessment, put on 2L of O2,  Her O2 came up to 90's but patient was tachypneic but O2 sats in 90's. patient had wound on right buttock and bruising on labia, patient says her pain is 10 out of 10 all the time everywhere, complained of back pain said we could not put bed down to turn her because it hurt her back really bad, patient wanted to use bedpan, instead of purewick, was able to  use bedpan but told her it would be less painful to use purewick because she would not have to turn, she let us put purewick on. Patient very anxious, safety precautions maintained,

## 2025-04-15 NOTE — PLAN OF CARE
Goal Outcome Evaluation:              Outcome Evaluation: Pt in room alone. Pt very anxious during visit with some SOB. Pt's nurse noted that pt was not having a good morning. Visit kept short. Noted UBW as 160 lb and denies any wt loss. Presents with wound in gluteal region. C/o pain. 3+ lower extremity edema per flow sheets. Current diet order NPO. Per MAR, allergic to milk compounds. Recommend regular diet once she gets a diet order to maximize PO intake. Will visit again during follow-up. Will monitor.

## 2025-04-15 NOTE — CONSULTS
Patient: BRANDON Melchor    YOB: 1928    Date: 2025    Primary Care Provider: Reginaldo Uribe MD    Chief Complaint   Patient presents with    Neck Pain    Weakness - Generalized    Urinary Retention       History of present illness:  Ms. Melchor is a 89 y.o.f. with past medical history of Beatties, hypertension and dyslipidemia who presented overnight for weakness.  She was found down in the bathroom yesterday afternoon complaining of a headache and neck pain.  In the emergency department she was found to have pain within her gluteal region and was noted to have a sacral ulcer with purulent drainage. General surgery was consulted for incision and debridement of sacral wound.  She does not know how long that has been there.  She notes she has had sacral ulcer for some time however the draining and pain is new.    Patient was in A-fib with RVR on arrival and given 2 doses of Lopressor IV.  She denies any therapeutic anticoagulation.    Review of Systems  14 point review of systems negative except for above findings.    History:  Past Medical History:   Diagnosis Date    A-fib     Diabetes mellitus     Glaucoma     Hyperlipidemia     Hypertension           Past Surgical History:   Procedure Laterality Date    HYSTERECTOMY      KNEE SURGERY Right     TOE SURGERY Right        History reviewed. No pertinent family history.    Social History     Tobacco Use    Smoking status: Former     Current packs/day: 0.00     Types: Cigarettes     Quit date:      Years since quittin.3     Passive exposure: Past    Smokeless tobacco: Never   Vaping Use    Vaping status: Never Used   Substance Use Topics    Alcohol use: Not Currently    Drug use: Never       Allergies:  Allergies   Allergen Reactions    Milk-Related Compounds Hives       Medications:     Current Facility-Administered Medications:     acetaminophen (TYLENOL) tablet 650 mg, 650 mg, Oral, Q4H PRN, 650 mg at 04/15/25  0503 **OR** acetaminophen (TYLENOL) 160 MG/5ML oral solution 650 mg, 650 mg, Oral, Q4H PRN **OR** acetaminophen (TYLENOL) suppository 650 mg, 650 mg, Rectal, Q4H PRN, Forrest Sanchez MD    sennosides-docusate (PERICOLACE) 8.6-50 MG per tablet 2 tablet, 2 tablet, Oral, BID PRN **AND** polyethylene glycol (MIRALAX) packet 17 g, 17 g, Oral, Daily PRN **AND** bisacodyl (DULCOLAX) EC tablet 5 mg, 5 mg, Oral, Daily PRN **AND** bisacodyl (DULCOLAX) suppository 10 mg, 10 mg, Rectal, Daily PRN, Forrest Sanchez MD    ceFAZolin 2000 mg IVPB in 100 mL NS (MBP), 2,000 mg, Intravenous, Q8H, Abdelrahman Mohamud MD, 2,000 mg at 04/15/25 1007    citalopram (CeleXA) tablet 10 mg, 10 mg, Oral, Daily, Forrest Sanchez MD, 10 mg at 04/15/25 0921    furosemide (LASIX) injection 40 mg, 40 mg, Intravenous, BID Diuretics, Abdelrahman Mohamud MD, 40 mg at 04/15/25 1003    losartan (COZAAR) tablet 25 mg, 25 mg, Oral, Q24H, Claire Jaeger APRN    metoprolol tartrate (LOPRESSOR) tablet 12.5 mg, 12.5 mg, Oral, Q12H, Forrest Sanchez MD, 12.5 mg at 04/15/25 0921    nitroglycerin (NITROSTAT) SL tablet 0.4 mg, 0.4 mg, Sublingual, Q5 Min PRN, Forrest Sanchez MD    Pharmacy To Dose: Piperacillin-tazobactam (Zosyn), , Not Applicable, Continuous PRN, Forrest Sanchez MD    sodium chloride 0.9 % flush 10 mL, 10 mL, Intravenous, PRNToñito Joseph, MD    sodium chloride 0.9 % flush 10 mL, 10 mL, Intravenous, Q12H, Forrest Sanchez MD    sodium chloride 0.9 % flush 10 mL, 10 mL, Intravenous, PRN, Forrest Sanchez MD    sodium chloride 0.9 % infusion 40 mL, 40 mL, Intravenous, PRN, Forrest Sanchez MD    Vital Signs:   Vitals:    04/15/25 0500 04/15/25 0707 04/15/25 0744 04/15/25 1053   BP: 156/66  147/98 164/86   BP Location: Left arm  Left arm Left arm   Patient Position: Lying  Lying Lying   Pulse: 106 (!) 121 92 103   Resp: 20 28 25   Temp: 97.4 °F (36.3 °C)  97.8 °F (36.6 °C) 97.6 °F (36.4 °C)   TempSrc: Oral  Oral Oral   SpO2: 96%  95% 96%   Weight: 80.9  kg (178 lb 5.6 oz)      Height:           Physical Exam:     General Appearance:    Alert, cooperative, in no acute distress, elderly appearing   Head:    Normocephalic, without obvious abnormality, atraumatic   Neck:   No adenopathy, supple, trachea midline   Back:     No skin lesions, erythema or scars, no tenderness palpation   Lungs:     B/L chest rise, no increase work of breathing     Heart:    Regular rhythm and normal rate   Chest Wall:    No abnormalities observed, no skin lesions   Abdomen:     Soft, ND, NT, no masses, no guarding, no rebound                tenderness   Rectal:     Deferred, sacral wound with purulent material expressed.  Erythema surrounding with induration.   Extremities:   Moves all extremities well, no edema   Pulses:   Pulses palpable and equal bilaterally   Skin:   No bleeding, bruising or rash   Lymph nodes:   No palpable adenopathy   Neurologic:   Cranial nerves 2 - 12 grossly intact, sensation intact       Results Review:     Results from last 7 days   Lab Units 04/15/25  0407 04/14/25  2129   WBC 10*3/mm3 19.16* 19.87*   HEMOGLOBIN g/dL 12.8 13.8   HEMATOCRIT % 40.1 42.7   PLATELETS 10*3/mm3 163 181        Results from last 7 days   Lab Units 04/15/25  0416 04/14/25  2129   SODIUM mmol/L 134* 129*   POTASSIUM mmol/L 3.6 4.6   CHLORIDE mmol/L 98 93*   CO2 mmol/L 23.0 23.0   BUN mg/dL 13 15   CREATININE mg/dL 0.42* 0.49*   CALCIUM mg/dL 8.7 9.2   BILIRUBIN mg/dL  --  0.7   ALK PHOS U/L  --  88   ALT (SGPT) U/L  --  18   AST (SGOT) U/L  --  25   GLUCOSE mg/dL 156* 178*       Assessment / Plan:    Ms. Melchor is a 89 y.o. female with past medical history of A-fib, DM, HTN, HLD who presents with infected sacral ulcer.  I was able to express purulent material from the now draining wound.  There is induration and cellulitis surrounding the wound.  I believe she would benefit from surgical debridement.  Agree with IV antibiotics for now.  Will plan to take to the OR for incision and  drainage.  Risk benefits and alternatives discussed with the patient who stated her understanding.  Consent was signed.  Remainder of care per primary team.    Plan:  OR today for debridement  Wound care consult to follow    Electronically signed by Dav Trevino MD  04/15/25  12:27 CDT

## 2025-04-15 NOTE — ANESTHESIA PREPROCEDURE EVALUATION
" Anesthesia Evaluation     Patient summary reviewed   no history of anesthetic complications:   NPO Solid Status: > 8 hours             Airway   Mallampati: II  Dental      Pulmonary    (+) a smoker Former,  Cardiovascular     (+) hypertension, dysrhythmias Atrial Fib      Neuro/Psych  GI/Hepatic/Renal/Endo    (+) diabetes mellitus    Musculoskeletal     Abdominal    Substance History      OB/GYN          Other                          Anesthesia Plan    ASA 3 - emergent     general     (infected sacral ulcer    Frailty, advanced age      \"She was administered 2500 mL of fluid for sepsis screening and based on exam appears to be volume overloaded with increased respiratory effort and rales\")  intravenous induction     Anesthetic plan, risks, benefits, and alternatives have been provided, discussed and informed consent has been obtained with: patient.        CODE STATUS:    Code Status (Patient has no pulse and is not breathing): No CPR (Do Not Attempt to Resuscitate)  Medical Interventions (Patient has pulse or is breathing): Limited Support  Medical Intervention Limits: No intubation (DNI)  Level Of Support Discussed With: Patient      "

## 2025-04-15 NOTE — BRIEF OP NOTE
DEBRIDEMENT SACRAL ULCER/WOUND  Progress Note    BRANODN Melchor  4/15/2025    Pre-op Diagnosis:   Sacral wound/abscess       Post-Op Diagnosis Codes:  Sacral wound/abscess    Procedure(s):  Excisional debridement of sacral wound to level of fascia    Procedure(s):  INCISION AND DEBRIDEMENT SACRAL WOUND    Surgeon(s):  Dav Trevino MD    Anesthesia: General    Staff:   Circulator: Piyush Wilson RN  Scrub Person: Ariela Beltrán; Joshua South  Assistant: Meeta Mcclure APRN  Assistant: Meeta Mcclure APRN    Estimated Blood Loss: minimal    Urine Voided: * No values recorded between 4/15/2025  1:06 PM and 4/15/2025  1:44 PM *    Specimens:                Specimens       ID Source Type Tests Collected By Collected At Frozen?    1 Sacrum Surgical Site ANAEROBIC CULTURE  FUNGAL CULTURE  WOUND CULTURE   Dav Trevino MD 4/15/25 1329     Description: SACRAL WOUND SWAB              Drains:   External Urinary Catheter (Active)   Site Assessment Other (Comment) 04/15/25 1346   Output (mL) 500 mL 04/15/25 1054       Findings: Sacral wound with underlying necrotic subcutaneous tissue status post excisional debridement with scissors to the level of fascia.  Wound opening 3 cm x 4 cm, underlying wound approximately 7 cm x 5 cm x 3 cm    Complications: None    Assistant: Meeta Mcclure APRN  was responsible for performing the following activities: Retraction, Suction, and Irrigation.  Their skilled assistance was necessary for the success of this case.    Dav Trevino MD     Date: 4/15/2025  Time: 14:03 CDT

## 2025-04-15 NOTE — H&P
[facility] Medicine Services  HISTORY AND PHYSICAL    Date of Admission: 4/14/2025  Primary Care Physician: Reginaldo Uribe MD    Subjective   Primary Historian: Patient    Chief Complaint: Generalized weakness    History of Present Illness  This is an 89-year-old female patient with a medical history of A-fib, diabetes mellitus, hypertension and dyslipidemia, brought into the ED for the evaluation of generalized weakness.  As reported, patient was down in her bathroom this afternoon, and she was complaining of headache and neck pain.  She was also complaining of generalized weakness and severe pain in her right gluteal region.  She believes that she has an infection back the.  She denies any fever, chills, shortness of breath, chest pain, nausea vomiting or diarrhea.        Review of Systems   Otherwise complete ROS reviewed and negative except as mentioned in the HPI.    Past Medical History:   Past Medical History:   Diagnosis Date    A-fib     Diabetes mellitus     Glaucoma     Hyperlipidemia     Hypertension      Past Surgical History:  Past Surgical History:   Procedure Laterality Date    HYSTERECTOMY      KNEE SURGERY Right     TOE SURGERY Right      Social History:  reports that she quit smoking about 50 years ago. Her smoking use included cigarettes. She has been exposed to tobacco smoke. She has never used smokeless tobacco. She reports that she does not currently use alcohol. She reports that she does not use drugs.    Family History: family history is not on file.       Allergies:  Allergies   Allergen Reactions    Milk-Related Compounds Hives       Medications:  Prior to Admission medications    Medication Sig Start Date End Date Taking? Authorizing Provider   citalopram (CeleXA) 10 MG tablet Take 1 tablet by mouth Daily.    Provider, MD Montrell   cloNIDine (CATAPRES) 0.2 MG tablet Take 1 tablet by mouth.    Provider, MD Montrell   dorzolamide-timolol  "(COSOPT) 2-0.5 % ophthalmic solution Administer 1 drop to both eyes 2 (Two) Times a Day.    Montrell Ivy MD   losartan (COZAAR) 50 MG tablet Take 1 tablet by mouth Daily.    Montrell Ivy MD   torsemide (DEMADEX) 10 MG tablet Take 1 tablet by mouth Daily.    Montrell Ivy MD   traMADol (ULTRAM) 50 MG tablet Take 1 tablet by mouth Every 6 (Six) Hours As Needed for Moderate Pain.    ProviderMontrell MD     I have utilized all available immediate resources to obtain, update, or review the patient's current medications (including all prescriptions, over-the-counter products, herbals, cannabis/cannabidiol products, and vitamin/mineral/dietary (nutritional) supplements).    Objective     Vital Signs: /88   Pulse 100 Comment: a-fib  Temp 99.2 °F (37.3 °C) (Oral)   Resp 24   Ht 167.6 cm (66\")   Wt 80.3 kg (177 lb)   SpO2 95%   BMI 28.57 kg/m²   Physical Exam  Constitutional:       Appearance: She is ill-appearing.   Cardiovascular:      Rate and Rhythm: Tachycardia present. Rhythm irregular.      Pulses: Normal pulses.      Heart sounds: Normal heart sounds. No murmur heard.  Pulmonary:      Effort: Pulmonary effort is normal. No respiratory distress.      Breath sounds: Normal breath sounds. No wheezing or rales.   Abdominal:      General: Bowel sounds are normal. There is no distension.      Palpations: Abdomen is soft.      Tenderness: There is no abdominal tenderness. There is no guarding.   Genitourinary:     Comments: There is wound with possible necrosis in her right gluteal region as shown in the below picture.     Musculoskeletal:      Right lower leg: No edema.      Left lower leg: No edema.   Skin:     General: Skin is warm.   Neurological:      General: No focal deficit present.      Mental Status: She is alert and oriented to person, place, and time.                    Results Reviewed:  Lab Results (last 24 hours)       Procedure Component Value Units Date/Time    " MRSA Screen, PCR (Inpatient) - Swab, Nares [[id number]]  (Normal) Collected: 04/15/25 0214    Specimen: Swab from Nares Updated: 04/15/25 0334     MRSA PCR No MRSA Detected    Narrative:      The negative predictive value of this diagnostic test is high and should only be used to consider de-escalating anti-MRSA therapy. A positive result may indicate colonization with MRSA and must be correlated clinically.    C-reactive Protein [[id number]]  (Abnormal) Collected: 04/14/25 2129    Specimen: Blood Updated: 04/15/25 0152     C-Reactive Protein 14.37 mg/dL     Blood Culture - Blood, Arm, Right [[id number]] Collected: 04/14/25 2340    Specimen: Blood from Arm, Right Updated: 04/14/25 2351    Blood Culture - Blood, Arm, Left [[id number]] Collected: 04/14/25 2335    Specimen: Blood from Arm, Left Updated: 04/14/25 2342    CK [[id number]]  (Normal) Collected: 04/14/25 2129    Specimen: Blood Updated: 04/14/25 2324     Creatine Kinase 104 U/L     Lactic Acid, Plasma [[id number]]  (Normal) Collected: 04/14/25 2129    Specimen: Blood Updated: 04/14/25 2321     Lactate 1.7 mmol/L     Comprehensive Metabolic Panel [[id number]]  (Abnormal) Collected: 04/14/25 2129    Specimen: Blood Updated: 04/14/25 2159     Glucose 178 mg/dL      BUN 15 mg/dL      Creatinine 0.49 mg/dL      Sodium 129 mmol/L      Potassium 4.6 mmol/L      Comment: Slight hemolysis detected by analyzer. Result may be falsely elevated.        Chloride 93 mmol/L      CO2 23.0 mmol/L      Calcium 9.2 mg/dL      Total Protein 7.4 g/dL      Albumin 3.8 g/dL      ALT (SGPT) 18 U/L      AST (SGOT) 25 U/L      Comment: Slight hemolysis detected by analyzer. Result may be falsely elevated.        Alkaline Phosphatase 88 U/L      Total Bilirubin 0.7 mg/dL      Globulin 3.6 gm/dL      A/G Ratio 1.1 g/dL      BUN/Creatinine Ratio 30.6     Anion Gap 13.0 mmol/L      eGFR 86.4 mL/min/1.73     Narrative:      GFR Categories in Chronic Kidney Disease (CKD)      GFR Category           GFR (mL/min/1.73)    Interpretation  G1                     90 or greater         Normal or high (1)  G2                      60-89                Mild decrease (1)  G3a                   45-59                Mild to moderate decrease  G3b                   30-44                Moderate to severe decrease  G4                    15-29                Severe decrease  G5                    14 or less           Kidney failure          (1)In the absence of evidence of kidney disease, neither GFR category G1 or G2 fulfill the criteria for CKD.    eGFR calculation 2021 CKD-EPI creatinine equation, which does not include race as a factor    Urinalysis With Culture If Indicated - Straight Cath [[id number]]  (Abnormal) Collected: 04/14/25 2142    Specimen: Urine from Straight Cath Updated: 04/14/25 2156     Color, UA Yellow     Appearance, UA Clear     pH, UA 7.0     Specific Gravity, UA 1.017     Glucose, UA Negative     Ketones, UA Trace     Bilirubin, UA Negative     Blood, UA Small (1+)     Protein,  mg/dL (2+)     Leuk Esterase, UA Negative     Nitrite, UA Negative     Urobilinogen, UA 1.0 E.U./dL    Narrative:      In absence of clinical symptoms, the presence of pyuria, bacteria, and/or nitrites on the urinalysis result does not correlate with infection.    Urinalysis, Microscopic Only - Straight Cath [[id number]]  (Abnormal) Collected: 04/14/25 2142    Specimen: Urine from Straight Cath Updated: 04/14/25 2156     RBC, UA 11-20 /HPF      WBC, UA 0-2 /HPF      Comment: Urine culture not indicated.        Bacteria, UA None Seen /HPF      Squamous Epithelial Cells, UA 0-2 /HPF      Hyaline Casts, UA None Seen /LPF      Methodology Automated Microscopy    BNP [[id number]]  (Abnormal) Collected: 04/14/25 2129    Specimen: Blood Updated: 04/14/25 2153     proBNP 3,486.0 pg/mL     Narrative:      This assay is used as an aid in the diagnosis of individuals suspected of having heart failure. It can be used as an aid in the  diagnosis of acute decompensated heart failure (ADHF) in patients presenting with signs and symptoms of ADHF to the emergency department (ED). In addition, NT-proBNP of <300 pg/mL indicates ADHF is not likely.    Age Range Result Interpretation  NT-proBNP Concentration (pg/mL:      <50             Positive            >450                   Gray                 300-450                    Negative             <300    50-75           Positive            >900                  Gray                300-900                  Negative            <300      >75             Positive            >1800                  Gray                300-1800                  Negative            <300    [facility] Draw [[id number]] Collected: 04/14/25 2129    Specimen: Blood Updated: 04/14/25 2146    Narrative:      The following orders were created for panel order [facility] Draw.  Procedure                               Abnormality         Status                     ---------                               -----------         ------                     Green Top (Gel)[[id number]]                                  Final result               Lavender Top[[id number]]                                     Final result               Red Top[[id number]]                                          Final result               Galeas Top[[id number]]                                         Final result               Light Blue Top[[id number]]                                   Final result                 Please view results for these tests on the individual orders.    Green Top (Gel) [[id number]] Collected: 04/14/25 2129    Specimen: Blood Updated: 04/14/25 2146     Extra Tube Hold for add-ons.     Comment: Auto resulted.       Lavender Top [[id number]] Collected: 04/14/25 2129    Specimen: Blood Updated: 04/14/25 2146     Extra Tube hold for add-on     Comment: Auto resulted       Red Top [[id number]] Collected: 04/14/25 2129    Specimen: Blood Updated: 04/14/25 2146      Extra Tube Hold for add-ons.     Comment: Auto resulted.       Gray Top [[id number]] Collected: 04/14/25 2129    Specimen: Blood Updated: 04/14/25 2146     Extra Tube Hold for add-ons.     Comment: Auto resulted.       Light Blue Top [[id number]] Collected: 04/14/25 2129    Specimen: Blood Updated: 04/14/25 2146     Extra Tube Hold for add-ons.     Comment: Auto resulted       Protime-INR [[id number]]  (Abnormal) Collected: 04/14/25 2129    Specimen: Blood Updated: 04/14/25 2145     Protime 16.3 Seconds      INR 1.25    CBC & Differential [[id number]]  (Abnormal) Collected: 04/14/25 2129    Specimen: Blood Updated: 04/14/25 2136    Narrative:      The following orders were created for panel order CBC & Differential.  Procedure                               Abnormality         Status                     ---------                               -----------         ------                     CBC Auto Differential[[id number]]        Abnormal            Final result                 Please view results for these tests on the individual orders.    CBC Auto Differential [[id number]]  (Abnormal) Collected: 04/14/25 2129    Specimen: Blood Updated: 04/14/25 2136     WBC 19.87 10*3/mm3      RBC 4.67 10*6/mm3      Hemoglobin 13.8 g/dL      Hematocrit 42.7 %      MCV 91.4 fL      MCH 29.6 pg      MCHC 32.3 g/dL      RDW 13.1 %      RDW-SD 43.9 fl      MPV 9.8 fL      Platelets 181 10*3/mm3      Neutrophil % 81.1 %      Lymphocyte % 11.5 %      Monocyte % 6.2 %      Eosinophil % 0.1 %      Basophil % 0.3 %      Immature Grans % 0.8 %      Neutrophils, Absolute 16.13 10*3/mm3      Lymphocytes, Absolute 2.29 10*3/mm3      Monocytes, Absolute 1.23 10*3/mm3      Eosinophils, Absolute 0.01 10*3/mm3      Basophils, Absolute 0.06 10*3/mm3      Immature Grans, Absolute 0.15 10*3/mm3      nRBC 0.0 /100 WBC           Imaging Results (Last 24 Hours)       Procedure Component Value Units Date/Time    CT Head Without Contrast [[id number]] Resulted:  04/14/25 2312     Updated: 04/14/25 2321    CT Cervical Spine Without Contrast [[id number]] Resulted: 04/14/25 2312     Updated: 04/14/25 2321    XR Chest 1 View [[id number]] Collected: 04/14/25 2138     Updated: 04/14/25 2143    Narrative:      EXAMINATION: XR CHEST 1 VW-  4/14/2025 9:39 PM     HISTORY: Shortness of breath. Edema.     FINDINGS: Upright frontal projection of the chest demonstrates elevation  of the right diaphragm with right basilar atelectasis. Lungs are  otherwise clear. No evidence of cardiac enlargement or vascular  redistribution to suggest congestive change. There is no effusion or  free air present.       Impression:      1.. Elevated right diaphragm with right basilar atelectasis. Lungs are  otherwise clear.     This report was signed and finalized on 4/14/2025 9:40 PM by Dr. Stevenson Spears MD.             I have personally reviewed and interpreted the radiology studies and ECG obtained at time of admission.     Assessment / Plan   Assessment:   Active Hospital Problems    Diagnosis     **Cellulitis        Treatment Plan  The patient will be admitted to my service here at [facility].     Assessment and plan:  #Right gluteal region skin and soft tissue infection, rule out abscess/necrosis.  #A-fib with RVR.  #Generalized weakness.      - Admitting to telemetry.  - ED contacted Dr. Costa from general surgery, who agreed to consult and may consider debridement during the day.  - Keeping the patient n.p.o. in case of any procedure with surgery.  - Continue empirically with Zosyn.  - Adding MRSA swab, came back negative.  - Pending cultures.  - Patient initially was in A-fib with RVR, was given 2 doses of Lopressor IV in the ED, with heart rate being controlled now.  I do not see that the patient is on any beta-blocker at home, so I added a small dose of metoprolol to tartrate 12.5 twice daily.  I also placed a consult with cardiology.  Patient has at least a [id number] score of  4, and would benefit from therapeutic anticoagulation. I asked her if she was on any therapeutic anticoagulation before and she said no.  I could not find any evidence of therapeutic anticoagulation in her chart search.  I would appreciate cardiology input in this regard.  - Given her DVT prophylaxis SCDs for now.  - PT and OT ordered.        Medical Decision Making  Number and Complexity of problems: 3 acute on moderate  Differential Diagnosis: As above    Conditions and Status        Condition is worsening.     MDM Data  External documents reviewed: Yes  Cardiac tracing (EKG, telemetry) interpretation: Yes  Radiology interpretation: Yes  Labs reviewed: Yes  Any tests that were considered but not ordered: No     Decision rules/scores evaluated (example [id number], Wells, etc): [id number] at least 4     Discussed with: Patient and ED provider     Care Planning  Shared decision making: Discussed the plan with the patient and she was agreeable  Code status and discussions: Discussed the CODE STATUS with the patient and she wants to be DNI DNR    Disposition  Social Determinants of Health that impact treatment or disposition: Not at the moment  Estimated length of stay is 2 to 3 days.     I confirmed that the patient's advanced care plan is present, code status is documented, and a surrogate decision maker is listed in the patient's medical record.     The patient was seen and examined by me on 4/15 at 1:25 AM.    Electronically signed by Forrest Sanchez MD, 04/15/25, 04:07 CDT.             This was a shared visit with the BERTHA. I reviewed and verified the documentation and independently performed the documented:

## 2025-04-15 NOTE — ANESTHESIA POSTPROCEDURE EVALUATION
Patient: BRANDON Melchor    Procedure Summary       Date: 04/15/25 Room / Location: [facility] OR [address] [place] [address] OR; [facility] CARDIOLOGY    Anesthesia Start: 1311 Anesthesia Stop: 1350    Procedures:       ADULT TRANSTHORACIC ECHO COMPLETE W/ CONT IF NECESSARY PER PROTOCOL      INCISION AND DEBRIDEMENT SACRAL WOUND Diagnosis: (Palpitations)    Scheduled Providers: Dav Trevino MD Provider: Randall Solis CRNA    Anesthesia Type: general ASA Status: 3 - Emergent            Anesthesia Type: general    Vitals  Vitals Value Taken Time   /83 04/15/25 13:48   Temp     Pulse 99 04/15/25 13:50   Resp     SpO2 94 % 04/15/25 13:50   Vitals shown include unfiled device data.        Post Anesthesia Care and Evaluation    Patient location during evaluation: PHASE II  Patient participation: complete - patient participated  Level of consciousness: awake and sleepy but conscious  Pain score: 0  Pain management: adequate    Airway patency: patent  Anesthetic complications: No anesthetic complications    Cardiovascular status: acceptable  Respiratory status: acceptable  Hydration status: acceptable

## 2025-04-16 LAB
ANION GAP SERPL CALCULATED.3IONS-SCNC: 14 MMOL/L (ref 5–15)
BASOPHILS # BLD AUTO: 0.04 10*3/MM3 (ref 0–0.2)
BASOPHILS NFR BLD AUTO: 0.2 % (ref 0–1.5)
BUN SERPL-MCNC: 17 MG/DL (ref 8–23)
BUN/CREAT SERPL: 29.8 (ref 7–25)
CALCIUM SPEC-SCNC: 9.3 MG/DL (ref 8.2–9.6)
CHLORIDE SERPL-SCNC: 93 MMOL/L (ref 98–107)
CO2 SERPL-SCNC: 27 MMOL/L (ref 22–29)
CREAT SERPL-MCNC: 0.57 MG/DL (ref 0.57–1)
DEPRECATED RDW RBC AUTO: 42.8 FL (ref 37–54)
EGFRCR SERPLBLD CKD-EPI 2021: 83.3 ML/MIN/1.73
EOSINOPHIL # BLD AUTO: 0 10*3/MM3 (ref 0–0.4)
EOSINOPHIL NFR BLD AUTO: 0 % (ref 0.3–6.2)
ERYTHROCYTE [DISTWIDTH] IN BLOOD BY AUTOMATED COUNT: 13.1 % (ref 12.3–15.4)
GLUCOSE SERPL-MCNC: 219 MG/DL (ref 65–99)
HCT VFR BLD AUTO: 45.4 % (ref 34–46.6)
HGB BLD-MCNC: 14.9 G/DL (ref 12–15.9)
IMM GRANULOCYTES # BLD AUTO: 0.13 10*3/MM3 (ref 0–0.05)
IMM GRANULOCYTES NFR BLD AUTO: 0.8 % (ref 0–0.5)
LYMPHOCYTES # BLD AUTO: 1.29 10*3/MM3 (ref 0.7–3.1)
LYMPHOCYTES NFR BLD AUTO: 8 % (ref 19.6–45.3)
MAGNESIUM SERPL-MCNC: 1.8 MG/DL (ref 1.7–2.3)
MCH RBC QN AUTO: 29.4 PG (ref 26.6–33)
MCHC RBC AUTO-ENTMCNC: 32.8 G/DL (ref 31.5–35.7)
MCV RBC AUTO: 89.7 FL (ref 79–97)
MONOCYTES # BLD AUTO: 1.29 10*3/MM3 (ref 0.1–0.9)
MONOCYTES NFR BLD AUTO: 8 % (ref 5–12)
NEUTROPHILS NFR BLD AUTO: 13.39 10*3/MM3 (ref 1.7–7)
NEUTROPHILS NFR BLD AUTO: 83 % (ref 42.7–76)
NRBC BLD AUTO-RTO: 0 /100 WBC (ref 0–0.2)
PLATELET # BLD AUTO: 188 10*3/MM3 (ref 140–450)
PMV BLD AUTO: 10.5 FL (ref 6–12)
POTASSIUM SERPL-SCNC: 3 MMOL/L (ref 3.5–5.2)
POTASSIUM SERPL-SCNC: 3.9 MMOL/L (ref 3.5–5.2)
RBC # BLD AUTO: 5.06 10*6/MM3 (ref 3.77–5.28)
SODIUM SERPL-SCNC: 134 MMOL/L (ref 136–145)
WBC NRBC COR # BLD AUTO: 16.14 10*3/MM3 (ref 3.4–10.8)

## 2025-04-16 PROCEDURE — 25010000002 CEFAZOLIN PER 500 MG: Performed by: GENERAL PRACTICE

## 2025-04-16 PROCEDURE — 97605 NEG PRS WND THER DME<=50SQCM: CPT

## 2025-04-16 PROCEDURE — 97162 PT EVAL MOD COMPLEX 30 MIN: CPT

## 2025-04-16 PROCEDURE — 80048 BASIC METABOLIC PNL TOTAL CA: CPT | Performed by: GENERAL PRACTICE

## 2025-04-16 PROCEDURE — 99231 SBSQ HOSP IP/OBS SF/LOW 25: CPT | Performed by: GENERAL PRACTICE

## 2025-04-16 PROCEDURE — 97164 PT RE-EVAL EST PLAN CARE: CPT

## 2025-04-16 PROCEDURE — 25010000002 ENOXAPARIN PER 10 MG: Performed by: INTERNAL MEDICINE

## 2025-04-16 PROCEDURE — 25010000002 FUROSEMIDE PER 20 MG: Performed by: GENERAL PRACTICE

## 2025-04-16 PROCEDURE — 99232 SBSQ HOSP IP/OBS MODERATE 35: CPT | Performed by: NURSE PRACTITIONER

## 2025-04-16 PROCEDURE — 97166 OT EVAL MOD COMPLEX 45 MIN: CPT

## 2025-04-16 PROCEDURE — 85025 COMPLETE CBC W/AUTO DIFF WBC: CPT | Performed by: GENERAL PRACTICE

## 2025-04-16 PROCEDURE — 84132 ASSAY OF SERUM POTASSIUM: CPT | Performed by: INTERNAL MEDICINE

## 2025-04-16 PROCEDURE — 25010000002 LABETALOL 5 MG/ML SOLUTION

## 2025-04-16 PROCEDURE — 83735 ASSAY OF MAGNESIUM: CPT | Performed by: INTERNAL MEDICINE

## 2025-04-16 RX ORDER — LOSARTAN POTASSIUM 50 MG/1
50 TABLET ORAL
Status: DISCONTINUED | OUTPATIENT
Start: 2025-04-17 | End: 2025-04-16

## 2025-04-16 RX ORDER — METOPROLOL TARTRATE 50 MG
50 TABLET ORAL 3 TIMES DAILY
Status: DISCONTINUED | OUTPATIENT
Start: 2025-04-16 | End: 2025-04-16

## 2025-04-16 RX ORDER — TRAMADOL HYDROCHLORIDE 50 MG/1
50 TABLET ORAL ONCE AS NEEDED
Status: COMPLETED | OUTPATIENT
Start: 2025-04-16 | End: 2025-04-16

## 2025-04-16 RX ORDER — CARVEDILOL 6.25 MG/1
12.5 TABLET ORAL 2 TIMES DAILY WITH MEALS
Status: DISCONTINUED | OUTPATIENT
Start: 2025-04-16 | End: 2025-04-17

## 2025-04-16 RX ORDER — LOSARTAN POTASSIUM 50 MG/1
50 TABLET ORAL
Status: DISCONTINUED | OUTPATIENT
Start: 2025-04-16 | End: 2025-04-19 | Stop reason: HOSPADM

## 2025-04-16 RX ORDER — SPIRONOLACTONE 25 MG/1
25 TABLET ORAL DAILY
Status: DISCONTINUED | OUTPATIENT
Start: 2025-04-16 | End: 2025-04-19 | Stop reason: HOSPADM

## 2025-04-16 RX ORDER — METOPROLOL TARTRATE 1 MG/ML
5 INJECTION, SOLUTION INTRAVENOUS ONCE
Status: COMPLETED | OUTPATIENT
Start: 2025-04-16 | End: 2025-04-16

## 2025-04-16 RX ORDER — POTASSIUM CHLORIDE 1500 MG/1
40 TABLET, EXTENDED RELEASE ORAL EVERY 4 HOURS
Status: ACTIVE | OUTPATIENT
Start: 2025-04-16 | End: 2025-04-16

## 2025-04-16 RX ORDER — LABETALOL HYDROCHLORIDE 5 MG/ML
10 INJECTION, SOLUTION INTRAVENOUS ONCE AS NEEDED
Status: COMPLETED | OUTPATIENT
Start: 2025-04-16 | End: 2025-04-16

## 2025-04-16 RX ORDER — TRAMADOL HYDROCHLORIDE 50 MG/1
25 TABLET ORAL ONCE AS NEEDED
Status: DISCONTINUED | OUTPATIENT
Start: 2025-04-16 | End: 2025-04-16

## 2025-04-16 RX ADMIN — LOSARTAN POTASSIUM 50 MG: 50 TABLET, FILM COATED ORAL at 11:41

## 2025-04-16 RX ADMIN — TRAMADOL HYDROCHLORIDE 50 MG: 50 TABLET, COATED ORAL at 00:38

## 2025-04-16 RX ADMIN — METOPROLOL TARTRATE 5 MG: 1 INJECTION, SOLUTION INTRAVENOUS at 23:15

## 2025-04-16 RX ADMIN — LABETALOL HYDROCHLORIDE 10 MG: 5 INJECTION INTRAVENOUS at 05:27

## 2025-04-16 RX ADMIN — CITALOPRAM HYDROBROMIDE 10 MG: 20 TABLET ORAL at 08:41

## 2025-04-16 RX ADMIN — LOSARTAN POTASSIUM 25 MG: 25 TABLET, FILM COATED ORAL at 08:42

## 2025-04-16 RX ADMIN — CEFAZOLIN 2000 MG: 2 INJECTION, POWDER, FOR SOLUTION INTRAMUSCULAR; INTRAVENOUS at 17:42

## 2025-04-16 RX ADMIN — POTASSIUM CHLORIDE 40 MEQ: 1500 TABLET, EXTENDED RELEASE ORAL at 16:02

## 2025-04-16 RX ADMIN — METOPROLOL TARTRATE 12.5 MG: 25 TABLET, FILM COATED ORAL at 08:41

## 2025-04-16 RX ADMIN — Medication 10 ML: at 21:30

## 2025-04-16 RX ADMIN — POTASSIUM CHLORIDE 40 MEQ: 1500 TABLET, EXTENDED RELEASE ORAL at 11:41

## 2025-04-16 RX ADMIN — CARVEDILOL 12.5 MG: 6.25 TABLET, FILM COATED ORAL at 11:41

## 2025-04-16 RX ADMIN — TRAMADOL HYDROCHLORIDE 50 MG: 50 TABLET, COATED ORAL at 22:45

## 2025-04-16 RX ADMIN — SPIRONOLACTONE 25 MG: 25 TABLET ORAL at 11:41

## 2025-04-16 RX ADMIN — POLYETHYLENE GLYCOL 3350 17 G: 17 POWDER, FOR SOLUTION ORAL at 11:42

## 2025-04-16 RX ADMIN — FUROSEMIDE 40 MG: 10 INJECTION, SOLUTION INTRAMUSCULAR; INTRAVENOUS at 08:42

## 2025-04-16 RX ADMIN — ENOXAPARIN SODIUM 40 MG: 100 INJECTION SUBCUTANEOUS at 16:02

## 2025-04-16 RX ADMIN — CEFAZOLIN 2000 MG: 2 INJECTION, POWDER, FOR SOLUTION INTRAMUSCULAR; INTRAVENOUS at 02:41

## 2025-04-16 RX ADMIN — CEFAZOLIN 2000 MG: 2 INJECTION, POWDER, FOR SOLUTION INTRAMUSCULAR; INTRAVENOUS at 11:03

## 2025-04-16 RX ADMIN — Medication 10 ML: at 08:47

## 2025-04-16 NOTE — PLAN OF CARE
Goal Outcome Evaluation:  Plan of Care Reviewed With: patient  Plan of Care Reviewed With: patient           Outcome Evaluation: PT re-eval complete for wound care and NPWT placement. The pt educated pt on role and benefits of NPWT and was agreeable. Pt was rolled onto R side to visualize wound bed. The wound measures at 2.4 cm x 1.7 cm x 1.8 cm and appeared red with granulating tissue below, bleeding controlled. The wound has undermining from 11-5 using clock method and ranged from 3.0-4.6 cm, most notable at 1 o clock. The wound was picture framed in attempt to maintain seal. 1 black foam piece placed directly to wound bed with use of q-tip probe to place foam into undermining and covered with tegaderm. Pressure applied and required reinforcements to maintain seal due to location. Nsg educated to place wet to dry or attempt sealing if seal was lost. The wound vac will be changed on M,W,F or PRN if seal is lost.    Anticipated Discharge Disposition (PT): skilled nursing facility

## 2025-04-16 NOTE — PROGRESS NOTES
[facility] Medicine Services  INPATIENT PROGRESS NOTE    Patient Name: BRANDON Melchor  Date of Admission: 4/14/2025  Today's Date: 04/16/25  Length of Stay: 1  Primary Care Physician: Reginaldo Uribe MD    Subjective   Chief Complaint: Generalized weakness    Patient has no new complaint this morning, she is feeling a lot better.    Review of Systems   All pertinent negatives and positives are as above. All other systems have been reviewed and are negative unless otherwise stated.     Objective    Temp:  [97.5 °F (36.4 °C)-98.1 °F (36.7 °C)] 97.5 °F (36.4 °C)  Heart Rate:  [120-131] 121  Resp:  [18] 18  BP: (141-188)/() 141/96  Physical Exam  GEN: Awake, alert,  uncomfortable and in respiratory distress  HEENT: Atraumatic, PERRLA, EOMI, Anicteric, Trachea midline  Lungs: Bilateral crackles with wheeze  Heart: RRR, +S1/s2, no rub  ABD: soft, nt/nd, +BS, no guarding/rebound  Extremities: atraumatic, no cyanosis, no edema  Groin: Patient has a right gluteal necrotic wound with induration and abscess drainage.  Skin: no rashes or lesions  Neuro: AAOx3, no focal deficits  Psych: normal mood & affect       Results Review:  I have reviewed the labs, radiology results, and diagnostic studies.    Laboratory Data:   Results from last 7 days   Lab Units 04/16/25  0355 04/15/25  0407 04/14/25 2129   WBC 10*3/mm3 16.14* 19.16* 19.87*   HEMOGLOBIN g/dL 14.9 12.8 13.8   HEMATOCRIT % 45.4 40.1 42.7   PLATELETS 10*3/mm3 188 163 181        Results from last 7 days   Lab Units 04/16/25  0355 04/15/25  0416 04/14/25 2129   SODIUM mmol/L 134* 134* 129*   POTASSIUM mmol/L 3.0* 3.6 4.6   CHLORIDE mmol/L 93* 98 93*   CO2 mmol/L 27.0 23.0 23.0   BUN mg/dL 17 13 15   CREATININE mg/dL 0.57 0.42* 0.49*   CALCIUM mg/dL 9.3 8.7 9.2   BILIRUBIN mg/dL  --   --  0.7   ALK PHOS U/L  --   --  88   ALT (SGPT) U/L  --   --  18   AST (SGOT) U/L  --   --  25   GLUCOSE mg/dL 219* 156* 178*  "      Culture Data:   No results found for: \"BLOODCX\", \"URINECX\", \"WOUNDCX\", \"MRSACX\", \"RESPCX\", \"STOOLCX\"    Radiology Data:   Imaging Results (Last 24 Hours)       ** No results found for the last 24 hours. **            I have reviewed the patient's current medications.     Assessment/Plan   Assessment  Active Hospital Problems    Diagnosis     **Cellulitis        Treatment Plan    -Acute hypoxic respiratory failure secondary to diastolic CHF exacerbation  Patient has improved significantly this morning, needing about 2 L of oxygen.  Continue oxygen support and wean down as tolerated.    -Probable diastolic congestive heart failure   Patient's proBNP was elevated on admission, echocardiogram was ordered and showed preserved ejection fraction of 56 to 60%.  Please on consult and has adjusted patient medication-switched metoprolol to Coreg, and started patient on Aldactone and discontinue furosemide.  Patient losartan was also continued.    -Right gluteal abscess/cellulitis  General Surgery was consulted and patient is status post surgery which was reported as follows-  Procedure(s):  Excisional debridement of sacral wound to level of fascia.  She was started on Zosyn from the emergency room, switched antibiotics to cefazolin.  We will follow surgical cultures and wound care consulted for continued wound management.    - A-fib with RVR on presentation  Cardiology has switched rate control to Coreg, patient not on anticoagulation because of recent falls.  We will continue rate control and follow cardiologist recommendations.    Other chronic medical conditions-  Diabetes mellitus  Hypertension  Hyperlipidemia    DVT prophylaxis-will start Lovenox    Electronically signed by Abdelrahman Mohamud MD, 04/16/25, 16:17 CDT.   "

## 2025-04-16 NOTE — PLAN OF CARE
Goal Outcome Evaluation:  Plan of Care Reviewed With: patient    Patient rested some during night, patient afib on monitor 108-119, gave labetalol for bp, patient got confused during night and tried to get up- bed alarms on , vss, safety precautions maintained,

## 2025-04-16 NOTE — THERAPY EVALUATION
Patient Name: BRANDON Melchor  : 1928    MRN: [id number]                              Today's Date: 2025       Admit Date: 2025    Visit Dx:     ICD-10-CM ICD-9-CM   1. Cellulitis of buttock  L03.317 682.5   2. Atrial fibrillation with rapid ventricular response  I48.91 427.31   3. Hyponatremia  E87.1 276.1   4. Wound of sacral region  S31.000A 959.19     Patient Active Problem List   Diagnosis    Cellulitis     Past Medical History:   Diagnosis Date    A-fib     Diabetes mellitus     Glaucoma     Hyperlipidemia     Hypertension      Past Surgical History:   Procedure Laterality Date    HYSTERECTOMY      KNEE SURGERY Right     TOE SURGERY Right       General Information       Row Name 25 0840          OT Time and Intention    Subjective Information no complaints  -     Document Type evaluation  admitted following being found on floor at home, s/p I&D sacral wound  -     Mode of Treatment occupational therapy  -     Patient Effort adequate  -       Row Name 25 0840          General Information    Patient Profile Reviewed yes  -     Prior Level of Function min assist:;transfer;all household mobility;bed mobility;ADL's  pt is poor historian, unsure of accuracy of PLOF  -     Existing Precautions/Restrictions fall  -     Barriers to Rehab medically complex;previous functional deficit;cognitive status  -       Row Name 25 0840          Occupational Profile    Environmental Supports and Barriers (Occupational Profile) walk-in shower  -       Row Name 25 0840          Living Environment    Current Living Arrangements home  -     People in Home other relative(s)  -       Row Name 25 0840          Home Main Entrance    Number of Stairs, Main Entrance four  -     Stair Railings, Main Entrance railings not in good condition, need repair for safe use;railings on both sides of stairs  -       Row Name 25 0840          Stairs Within Home, Primary     Number of Stairs, Within Home, Primary none  -       Row Name 04/16/25 0840          Cognition    Orientation Status (Cognition) oriented to;person;disoriented to;place;situation;time  -       Row Name 04/16/25 0840          Safety Issues/Impairments Affecting Functional Mobility    Impairments Affecting Function (Mobility) balance;cognition;endurance/activity tolerance;pain;strength  -     Cognitive Impairments, Mobility Safety/Performance awareness, need for assistance;insight into deficits/self-awareness;judgment;problem-solving/reasoning;safety precaution awareness;safety precaution follow-through  -               User Key  (r) = Recorded By, (t) = Taken By, (c) = Cosigned By      Initials Name Provider Type     Rozina Hernández OTR/L Occupational Therapist                     Mobility/ADL's       Row Name 04/16/25 0840          Bed Mobility    Bed Mobility supine-sit  -     Supine-Sit [place] (Bed Mobility) minimum assist (75% patient effort);verbal cues;nonverbal cues (demo/gesture)  -     Assistive Device (Bed Mobility) bed rails;head of bed elevated  -       Row Name 04/16/25 0840          Transfers    Transfers sit-stand transfer;stand-sit transfer;bed-chair transfer  -       Row Name 04/16/25 0840          Bed-Chair Transfer    Bed-Chair [place] (Transfers) minimum assist (75% patient effort);2 person assist  -     Comment, (Bed-Chair Transfer) [facility]       Row Name 04/16/25 0840          Sit-Stand Transfer    Sit-Stand [place] (Transfers) minimum assist (75% patient effort)  -     Comment, (Sit-Stand Transfer) [facility]       Row Name 04/16/25 0840          Stand-Sit Transfer    Stand-Sit [place] (Transfers) minimum assist (75% patient effort)  -     Comment, (Stand-Sit Transfer) [facility]       Row Name 04/16/25 0840          Functional Mobility    Functional Mobility- Comment pivot to chair from bed  -     Patient was able to Ambulate yes  -[facility]  Name 04/16/25 0840          Activities of Daily Living    BADL Assessment/Intervention lower body dressing  -KP       Row Name 04/16/25 0840          Lower Body Dressing Assessment/Training    [place] Level (Lower Body Dressing) don;socks;dependent (less than 25% patient effort)  -KP               User Key  (r) = Recorded By, (t) = Taken By, (c) = Cosigned By      Initials Name Provider Type    KP Rozina Hernández OTR/L Occupational Therapist                   Obj/Interventions       Row Name 04/16/25 0840          Sensory Assessment (Somatosensory)    Sensory Assessment (Somatosensory) unable/difficult to assess  -KP       Row Name 04/16/25 0840          Vision Assessment/Intervention    Visual Impairment/Limitations WFL  -KP       Row Name 04/16/25 0840          Range of Motion Comprehensive    General Range of Motion bilateral upper extremity ROM WNL  -KP       Row Name 04/16/25 0840          Strength Comprehensive (MMT)    Comment, General Manual Muscle Testing (MMT) Assessment did not formally test BUE due to cognitive status of pt  -KP       Row Name 04/16/25 0840          Balance    Balance Assessment sitting static balance;sitting dynamic balance;standing static balance;standing dynamic balance  -KP     Static Sitting Balance standby assist  -KP     Dynamic Sitting Balance contact guard  -KP     Position, Sitting Balance unsupported;sitting edge of bed  -KP     Static Standing Balance minimal assist  -KP     Dynamic Standing Balance minimal assist;2-person assist  -KP     Position/Device Used, Standing Balance supported;other (see comments)  HHA  -KP               User Key  (r) = Recorded By, (t) = Taken By, (c) = Cosigned By      Initials Name Provider Type    KP Rozina Hernández OTR/L Occupational Therapist                   Goals/Plan       Row Name 04/16/25 0840          Transfer Goal 1 (OT)    Activity/Assistive Device (Transfer Goal 1, OT) toilet  -KP     [place] Level/Cues Needed (Transfer  Goal 1, OT) standby assist  -KP     Time Frame (Transfer Goal 1, OT) long term goal (LTG);10 days  -KP     Progress/Outcome (Transfer Goal 1, OT) new [facility]  -       Row Name 04/16/25 0840          Dressing Goal 1 (OT)    Activity/Device (Dressing Goal 1, OT) dressing skills, all  -KP     [place]/Cues Needed (Dressing Goal 1, OT) standby assist  -KP     Time Frame (Dressing Goal 1, OT) long term goal (LTG);10 days  -KP     Progress/Outcome (Dressing Goal 1, OT) new [facility]  -[facility] Name 04/16/25 0840          Toileting Goal 1 (OT)    Activity/Device (Toileting Goal 1, OT) toileting skills, all  -KP     [place] Level/Cues Needed (Toileting Goal 1, OT) standby assist  -KP     Time Frame (Toileting Goal 1, OT) long term goal (LTG);10 days  -KP     Progress/Outcome (Toileting Goal 1, OT) new [facility] Name 04/16/25 0840          Therapy Assessment/Plan (OT)    Planned Therapy Interventions (OT) activity tolerance training;BADL retraining;functional balance retraining;occupation/activity based interventions;patient/caregiver education/training;ROM/therapeutic exercise;strengthening exercise;transfer/mobility retraining  -               User Key  (r) = Recorded By, (t) = Taken By, (c) = Cosigned By      Initials Name Provider Type    Rozina Andre, OTR/L Occupational Therapist                   Clinical Impression       Row Name 04/16/25 0840          Pain Assessment    Pain Location buttock  -     Pain Side/Orientation upper  -     Pain Management Interventions exercise or physical activity utilized;activity modification encouraged;positioning techniques utilized  -     Response to Pain Interventions no change per patient report  -     Additional Documentation Pain Scale: FACES Pre/Post-Treatment (Group)  -       Row Name 04/16/25 0840          Pain Scale: FACES Pre/Post-Treatment    Pain: FACES Scale, Pretreatment 0-->no hurt  -     Posttreatment Pain Rating 2-->hurts little bit   -       Row Name 04/16/25 0840          Plan of Care Review    Plan of Care Reviewed With patient  -     Outcome Evaluation OT evaluation completed. Pt is A&O to self only, pt is able to recall various parts of what has happened recently but memory if very jumbled in regards to when/where she was on floor, where she is now. Pt is conversationally confused throughout eval. According to pt, at baseline she requires very little to no assistance with day to day tasks, uses walker and no longer drives/cooks or cleans. Today, she is able to come EOB w/ min A as well as t/f with min A x2 and HHA bed>recliner. Pt w/ reports of minimal pain through sacral wound, which she just underwent I&D for. Pt was dependent to don socks, refusing to eat today reporting she is not hungry. Ot to cont to see pt for deficits, D/C rec SNF for further therapy. Pt left in recliner with chair alarm, cushon and call light. Encouraged pt to call for assistance.  -       Row Name 04/16/25 0840          Therapy Assessment/Plan (OT)    Rehab Potential (OT) fair  -     Criteria for Skilled Therapeutic Interventions Met (OT) yes;meets criteria  -     Therapy Frequency (OT) 5 times/wk  -     Predicted Duration of Therapy Intervention (OT) 10 days  -       Row Name 04/16/25 0840          Therapy Plan Review/Discharge Plan (OT)    Anticipated Discharge Disposition (OT) skilled nursing facility  -       Row Name 04/16/25 0840          Positioning and Restraints    Pre-Treatment Position in bed  -     Post Treatment Position chair  -     In Chair notified nsg;reclined;call light within reach;encouraged to call for assist;exit alarm on  -               User Key  (r) = Recorded By, (t) = Taken By, (c) = Cosigned By      Initials Name Provider Type    Rozina Andre, OTR/L Occupational Therapist                   Outcome Measures       Row Name 04/16/25 0840          How much help from another is currently needed...    Putting on and  taking off regular lower body clothing? 1  -KP     Bathing (including washing, rinsing, and drying) 2  -KP     Toileting (which includes using toilet bed pan or urinal) 2  -KP     Putting on and taking off regular upper body clothing 2  -KP     Taking care of personal grooming (such as brushing teeth) 3  -KP     Eating meals 3  -KP     AM-PAC 6 Clicks Score (OT) 13  -       Row Name 04/16/25 0840          Functional Assessment    Outcome Measure Options AM-PAC 6 Clicks Daily Activity (OT)  -               User Key  (r) = Recorded By, (t) = Taken By, (c) = Cosigned By      Initials Name Provider Type     Rozina Hernández OTR/L Occupational Therapist                    Occupational Therapy Education       Title: PT OT SLP Therapies (Done)       Topic: Occupational Therapy (Done)       Point: ADL training (Done)       Learning Progress Summary            Patient Acceptance, E,D, VU,DU,NR by  at 4/16/2025 0950                      Point: Precautions (Done)       Learning Progress Summary            Patient Acceptance, E,D, VU,DU,NR by  at 4/16/2025 0950                      Point: Body mechanics (Done)       Learning Progress Summary            Patient Acceptance, E,D, VU,DU,NR by  at 4/16/2025 0950                                      User Key       Initials Effective Dates Name Provider Type [facility] 10/08/24 -  Rozina Hernández OTR/L Occupational Therapist OT                  OT Recommendation and Plan  Planned Therapy Interventions (OT): activity tolerance training, BADL retraining, functional balance retraining, occupation/activity based interventions, patient/caregiver education/training, ROM/therapeutic exercise, strengthening exercise, transfer/mobility retraining  Therapy Frequency (OT): 5 times/wk  Plan of Care Review  Plan of Care Reviewed With: patient  Outcome Evaluation: OT evaluation completed. Pt is A&O to self only, pt is able to recall various parts of what has happened recently but  memory if very jumbled in regards to when/where she was on floor, where she is now. Pt is conversationally confused throughout eval. According to pt, at baseline she requires very little to no assistance with day to day tasks, uses walker and no longer drives/cooks or cleans. Today, she is able to come EOB w/ min A as well as t/f with min A x2 and HHA bed>recliner. Pt w/ reports of minimal pain through sacral wound, which she just underwent I&D for. Pt was dependent to don socks, refusing to eat today reporting she is not hungry. Ot to cont to see pt for deficits, D/C rec SNF for further therapy. Pt left in recliner with chair alarm, cushon and call light. Encouraged pt to call for assistance.     Time Calculation:         Time Calculation- OT       Row Name 04/16/25 0840             Time Calculation- OT    OT Start Time 0840  +10 for CR  -KP      OT Stop Time 0910  -KP      OT Time Calculation (min) 30 min  -KP      OT Received On 04/16/25  -KP      OT Goal Re-Cert Due Date 04/26/25  -         Untimed Charges    OT Eval/Re-eval Minutes 40  -KP         Total Minutes    Untimed Charges Total Minutes 40  -KP       Total Minutes 40  -KP                User Key  (r) = Recorded By, (t) = Taken By, (c) = Cosigned By      Initials Name Provider Type    Rozina Andre OTR/L Occupational Therapist                  Therapy Charges for Today       Code Description Service Date Service Provider Modifiers Qty    [id number] HC OT EVAL MOD COMPLEXITY 3 4/16/2025 Rozina Hernández OTR/L GO 1                 KATIE Blanc/MILAGROS  4/16/2025

## 2025-04-16 NOTE — PROGRESS NOTES
[facility] GENERAL SURGERY    PROGRESS NOTE    Today's Date: 25    Patient Name: BRANDON Melchor  MRN: [id number]  CSN: [id number]  PCP: Reignaldo Uribe MD  Attending Provider: Abdelrahman Mohamud MD  Length of Stay: 1    SUBJECTIVE     BRANDON Melchor is a 89-year-old female that presents status post I&D of right buttock wound on 4/15 by Dr. Trevino.  Patient was admitted to hospital on  after being found down in her bathroom complaining of headache and neck pain.        Visit Dx:    ICD-10-CM ICD-9-CM   1. Cellulitis of buttock  L03.317 682.5   2. Atrial fibrillation with rapid ventricular response  I48.91 427.31   3. Hyponatremia  E87.1 276.1   4. Wound of sacral region  S31.000A 959.19       Hospital Problem List:     Cellulitis      History:   Past Medical History:   Diagnosis Date    A-fib     Diabetes mellitus     Glaucoma     Hyperlipidemia     Hypertension      Past Surgical History:   Procedure Laterality Date    HYSTERECTOMY      KNEE SURGERY Right     TOE SURGERY Right      Social History     Socioeconomic History    Marital status:    Tobacco Use    Smoking status: Former     Current packs/day: 0.00     Types: Cigarettes     Quit date:      Years since quittin.3     Passive exposure: Past    Smokeless tobacco: Never   Vaping Use    Vaping status: Never Used   Substance and Sexual Activity    Alcohol use: Not Currently    Drug use: Never    Sexual activity: Not Currently       Allergies:  Allergies   Allergen Reactions    Milk-Related Compounds Hives       Medications:    Current Facility-Administered Medications:     acetaminophen (TYLENOL) tablet 650 mg, 650 mg, Oral, Q4H PRN, 650 mg at 04/15/25 1823 **OR** acetaminophen (TYLENOL) 160 MG/5ML oral solution 650 mg, 650 mg, Oral, Q4H PRN **OR** acetaminophen (TYLENOL) suppository 650 mg, 650 mg, Rectal, Q4H PRN, Dav Trevino MD    sennosides-docusate (PERICOLACE) 8.6-50 MG per tablet 2 tablet, 2  tablet, Oral, BID PRN **AND** polyethylene glycol (MIRALAX) packet 17 g, 17 g, Oral, Daily PRN **AND** bisacodyl (DULCOLAX) EC tablet 5 mg, 5 mg, Oral, Daily PRN **AND** bisacodyl (DULCOLAX) suppository 10 mg, 10 mg, Rectal, Daily PRN, Dav Trevino MD    ceFAZolin 2000 mg IVPB in 100 mL NS (MBP), 2,000 mg, Intravenous, Q8H, Dav Trevino MD, 2,000 mg at 04/16/25 0241    citalopram (CeleXA) tablet 10 mg, 10 mg, Oral, Daily, Dav Trevino MD, 10 mg at 04/15/25 0921    enoxaparin sodium (LOVENOX) syringe 40 mg, 40 mg, Subcutaneous, Q24H, Abdelrahman Mohamud MD, 40 mg at 04/15/25 1514    furosemide (LASIX) injection 40 mg, 40 mg, Intravenous, BID Diuretics, Dav Trevino MD, 40 mg at 04/15/25 1824    losartan (COZAAR) tablet 25 mg, 25 mg, Oral, Q24H, Dav Trevino MD, 25 mg at 04/15/25 1531    metoprolol tartrate (LOPRESSOR) tablet 12.5 mg, 12.5 mg, Oral, Q12H, Dav Trevino MD, 12.5 mg at 04/15/25 2023    nitroglycerin (NITROSTAT) SL tablet 0.4 mg, 0.4 mg, Sublingual, Q5 Min PRN, Dav Trevino MD    sodium chloride 0.9 % flush 10 mL, 10 mL, Intravenous, PRN, Dav Trevino MD    sodium chloride 0.9 % flush 10 mL, 10 mL, Intravenous, Q12H, Dav Trevino MD    sodium chloride 0.9 % flush 10 mL, 10 mL, Intravenous, PRN, Dav Trevino MD    sodium chloride 0.9 % infusion 40 mL, 40 mL, Intravenous, PRN, Dav Trevino MD      OBJECTIVE     Vitals:    04/16/25 0730   BP: (!) 178/104   Pulse: (!) 129   Resp: 18   Temp: 97.5 °F (36.4 °C)   SpO2: 94%       Temp:  [97.5 °F (36.4 °C)-99.6 °F (37.6 °C)] 97.5 °F (36.4 °C)  Heart Rate:  [] 129  Resp:  [17-25] 18  BP: (132-188)/() 178/104  Flow (L/min) (Oxygen Therapy):  [2] 2     Physical Exam:                General Appearance:    Alert, cooperative, in no acute distress, elderly appearing   Head:    Normocephalic, without obvious abnormality, atraumatic   Neck:   No adenopathy, supple, trachea midline   Back:     No skin lesions, erythema or scars, no  tenderness palpation   Lungs:     B/L chest rise, no increase work of breathing     Heart:    Regular rhythm and normal rate   Chest Wall:    No abnormalities observed, no skin lesions   Abdomen:     Soft, ND, NT, no masses, no guarding, no rebound                tenderness   Rectal:     Deferred, sacral wound with wet-to-dry dressing in place, surrounding erythema and induration improved.   Extremities:   Moves all extremities well, no edema   Pulses:   Pulses palpable and equal bilaterally   Skin:   No bleeding, bruising or rash   Lymph nodes:   No palpable adenopathy   Neurologic:   Cranial nerves 2 - 12 grossly intact, sensation intact           Results Review:  Lab Results (last 48 hours)       Procedure Component Value Units Date/Time    Wound Culture - Surgical Site, Sacrum [[id number]] Collected: 04/15/25 1329    Specimen: Surgical Site from Sacrum Updated: 04/16/25 0711     Wound Culture No growth     Gram Stain Few (2+) Gram positive cocci      Rare (1+) Gram positive bacilli      No WBCs seen    Basic Metabolic Panel [[id number]]  (Abnormal) Collected: 04/16/25 0355    Specimen: Blood Updated: 04/16/25 0458     Glucose 219 mg/dL      BUN 17 mg/dL      Creatinine 0.57 mg/dL      Sodium 134 mmol/L      Potassium 3.0 mmol/L      Chloride 93 mmol/L      CO2 27.0 mmol/L      Calcium 9.3 mg/dL      BUN/Creatinine Ratio 29.8     Anion Gap 14.0 mmol/L      eGFR 83.3 mL/min/1.73     Narrative:      GFR Categories in Chronic Kidney Disease (CKD)      GFR Category          GFR (mL/min/1.73)    Interpretation  G1                     90 or greater         Normal or high (1)  G2                      60-89                Mild decrease (1)  G3a                   45-59                Mild to moderate decrease  G3b                   30-44                Moderate to severe decrease  G4                    15-29                Severe decrease  G5                    14 or less           Kidney failure          (1)In the absence  of evidence of kidney disease, neither GFR category G1 or G2 fulfill the criteria for CKD.    eGFR calculation 2021 CKD-EPI creatinine equation, which does not include race as a factor    Magnesium [[id number]]  (Normal) Collected: 04/16/25 0355    Specimen: Blood Updated: 04/16/25 0458     Magnesium 1.8 mg/dL     CBC & Differential [[id number]]  (Abnormal) Collected: 04/16/25 0355    Specimen: Blood Updated: 04/16/25 0454    Narrative:      The following orders were created for panel order CBC & Differential.  Procedure                               Abnormality         Status                     ---------                               -----------         ------                     CBC Auto Differential[[id number]]        Abnormal            Final result                 Please view results for these tests on the individual orders.    CBC Auto Differential [[id number]]  (Abnormal) Collected: 04/16/25 0355    Specimen: Blood Updated: 04/16/25 0454     WBC 16.14 10*3/mm3      RBC 5.06 10*6/mm3      Hemoglobin 14.9 g/dL      Hematocrit 45.4 %      MCV 89.7 fL      MCH 29.4 pg      MCHC 32.8 g/dL      RDW 13.1 %      RDW-SD 42.8 fl      MPV 10.5 fL      Platelets 188 10*3/mm3      Neutrophil % 83.0 %      Lymphocyte % 8.0 %      Monocyte % 8.0 %      Eosinophil % 0.0 %      Basophil % 0.2 %      Immature Grans % 0.8 %      Neutrophils, Absolute 13.39 10*3/mm3      Lymphocytes, Absolute 1.29 10*3/mm3      Monocytes, Absolute 1.29 10*3/mm3      Eosinophils, Absolute 0.00 10*3/mm3      Basophils, Absolute 0.04 10*3/mm3      Immature Grans, Absolute 0.13 10*3/mm3      nRBC 0.0 /100 WBC     Blood Culture - Blood, Arm, Right [[id number]]  (Normal) Collected: 04/14/25 2340    Specimen: Blood from Arm, Right Updated: 04/16/25 0000     Blood Culture No growth at 24 hours    Blood Culture - Blood, Arm, Left [[id number]]  (Normal) Collected: 04/14/25 2335    Specimen: Blood from Arm, Left Updated: 04/15/25 2345     Blood Culture No  growth at 24 hours    Anaerobic Culture - Surgical Site, Sacrum [[id number]] Collected: 04/15/25 1329    Specimen: Surgical Site from Sacrum Updated: 04/15/25 1437    Fungus Culture - Surgical Site, Sacrum [[id number]] Collected: 04/15/25 1329    Specimen: Surgical Site from Sacrum Updated: 04/15/25 1437    POC Glucose Once [[id number]]  (Abnormal) Collected: 04/15/25 1349    Specimen: Blood Updated: 04/15/25 1401     Glucose 179 mg/dL      Comment: : [id number] Fariba FitzpatrickuaMeter ID: [id number]       Magnesium [[id number]]  (Normal) Collected: 04/15/25 0416    Specimen: Blood Updated: 04/15/25 1003     Magnesium 1.8 mg/dL     TSH [[id number]]  (Normal) Collected: 04/15/25 0416    Specimen: Blood Updated: 04/15/25 0527     TSH 0.826 uIU/mL     Basic Metabolic Panel [[id number]]  (Abnormal) Collected: 04/15/25 0416    Specimen: Blood Updated: 04/15/25 0527     Glucose 156 mg/dL      BUN 13 mg/dL      Creatinine 0.42 mg/dL      Sodium 134 mmol/L      Potassium 3.6 mmol/L      Chloride 98 mmol/L      CO2 23.0 mmol/L      Calcium 8.7 mg/dL      BUN/Creatinine Ratio 31.0     Anion Gap 13.0 mmol/L      eGFR 89.6 mL/min/1.73     Narrative:      GFR Categories in Chronic Kidney Disease (CKD)      GFR Category          GFR (mL/min/1.73)    Interpretation  G1                     90 or greater         Normal or high (1)  G2                      60-89                Mild decrease (1)  G3a                   45-59                Mild to moderate decrease  G3b                   30-44                Moderate to severe decrease  G4                    15-29                Severe decrease  G5                    14 or less           Kidney failure          (1)In the absence of evidence of kidney disease, neither GFR category G1 or G2 fulfill the criteria for CKD.    eGFR calculation 2021 CKD-EPI creatinine equation, which does not include race as a factor    Sedimentation Rate [[id number]]  (Normal) Collected: 04/15/25 0407    Specimen:  Blood Updated: 04/15/25 0512     Sed Rate 27 mm/hr     CBC (No Diff) [[id number]]  (Abnormal) Collected: 04/15/25 0407    Specimen: Blood Updated: 04/15/25 0458     WBC 19.16 10*3/mm3      RBC 4.30 10*6/mm3      Hemoglobin 12.8 g/dL      Hematocrit 40.1 %      MCV 93.3 fL      MCH 29.8 pg      MCHC 31.9 g/dL      RDW 13.2 %      RDW-SD 45.0 fl      MPV 10.4 fL      Platelets 163 10*3/mm3     MRSA Screen, PCR (Inpatient) - Swab, Nares [[id number]]  (Normal) Collected: 04/15/25 0214    Specimen: Swab from Nares Updated: 04/15/25 0334     MRSA PCR No MRSA Detected    Narrative:      The negative predictive value of this diagnostic test is high and should only be used to consider de-escalating anti-MRSA therapy. A positive result may indicate colonization with MRSA and must be correlated clinically.    C-reactive Protein [[id number]]  (Abnormal) Collected: 04/14/25 2129    Specimen: Blood Updated: 04/15/25 0152     C-Reactive Protein 14.37 mg/dL     CK [[id number]]  (Normal) Collected: 04/14/25 2129    Specimen: Blood Updated: 04/14/25 2324     Creatine Kinase 104 U/L     Lactic Acid, Plasma [[id number]]  (Normal) Collected: 04/14/25 2129    Specimen: Blood Updated: 04/14/25 2321     Lactate 1.7 mmol/L     Comprehensive Metabolic Panel [[id number]]  (Abnormal) Collected: 04/14/25 2129    Specimen: Blood Updated: 04/14/25 2159     Glucose 178 mg/dL      BUN 15 mg/dL      Creatinine 0.49 mg/dL      Sodium 129 mmol/L      Potassium 4.6 mmol/L      Comment: Slight hemolysis detected by analyzer. Result may be falsely elevated.        Chloride 93 mmol/L      CO2 23.0 mmol/L      Calcium 9.2 mg/dL      Total Protein 7.4 g/dL      Albumin 3.8 g/dL      ALT (SGPT) 18 U/L      AST (SGOT) 25 U/L      Comment: Slight hemolysis detected by analyzer. Result may be falsely elevated.        Alkaline Phosphatase 88 U/L      Total Bilirubin 0.7 mg/dL      Globulin 3.6 gm/dL      A/G Ratio 1.1 g/dL      BUN/Creatinine Ratio 30.6     Anion Gap  13.0 mmol/L      eGFR 86.4 mL/min/1.73     Narrative:      GFR Categories in Chronic Kidney Disease (CKD)      GFR Category          GFR (mL/min/1.73)    Interpretation  G1                     90 or greater         Normal or high (1)  G2                      60-89                Mild decrease (1)  G3a                   45-59                Mild to moderate decrease  G3b                   30-44                Moderate to severe decrease  G4                    15-29                Severe decrease  G5                    14 or less           Kidney failure          (1)In the absence of evidence of kidney disease, neither GFR category G1 or G2 fulfill the criteria for CKD.    eGFR calculation 2021 CKD-EPI creatinine equation, which does not include race as a factor    Urinalysis With Culture If Indicated - Straight Cath [[id number]]  (Abnormal) Collected: 04/14/25 2142    Specimen: Urine from Straight Cath Updated: 04/14/25 2156     Color, UA Yellow     Appearance, UA Clear     pH, UA 7.0     Specific Gravity, UA 1.017     Glucose, UA Negative     Ketones, UA Trace     Bilirubin, UA Negative     Blood, UA Small (1+)     Protein,  mg/dL (2+)     Leuk Esterase, UA Negative     Nitrite, UA Negative     Urobilinogen, UA 1.0 E.U./dL    Narrative:      In absence of clinical symptoms, the presence of pyuria, bacteria, and/or nitrites on the urinalysis result does not correlate with infection.    Urinalysis, Microscopic Only - Straight Cath [[id number]]  (Abnormal) Collected: 04/14/25 2142    Specimen: Urine from Straight Cath Updated: 04/14/25 2156     RBC, UA 11-20 /HPF      WBC, UA 0-2 /HPF      Comment: Urine culture not indicated.        Bacteria, UA None Seen /HPF      Squamous Epithelial Cells, UA 0-2 /HPF      Hyaline Casts, UA None Seen /LPF      Methodology Automated Microscopy    BNP [[id number]]  (Abnormal) Collected: 04/14/25 2129    Specimen: Blood Updated: 04/14/25 2153     proBNP 3,486.0 pg/mL     Narrative:       This assay is used as an aid in the diagnosis of individuals suspected of having heart failure. It can be used as an aid in the diagnosis of acute decompensated heart failure (ADHF) in patients presenting with signs and symptoms of ADHF to the emergency department (ED). In addition, NT-proBNP of <300 pg/mL indicates ADHF is not likely.    Age Range Result Interpretation  NT-proBNP Concentration (pg/mL:      <50             Positive            >450                   Gray                 300-450                    Negative             <300    50-75           Positive            >900                  Gray                300-900                  Negative            <300      >75             Positive            >1800                  Gray                300-1800                  Negative            <300    [place] Draw [[id number]] Collected: 04/14/25 2129    Specimen: Blood Updated: 04/14/25 2146    Narrative:      The following orders were created for panel order [place] Draw.  Procedure                               Abnormality         Status                     ---------                               -----------         ------                     Green Top (Gel)[[id number]]                                  Final result               Lavender Top[[id number]]                                     Final result               Red Top[[id number]]                                          Final result               Galeas Top[[id number]]                                         Final result               Light Blue Top[[id number]]                                   Final result                 Please view results for these tests on the individual orders.    Green Top (Gel) [[id number]] Collected: 04/14/25 2129    Specimen: Blood Updated: 04/14/25 2146     Extra Tube Hold for add-ons.     Comment: Auto resulted.       Lavender Top [[id number]] Collected: 04/14/25 2129    Specimen: Blood Updated: 04/14/25 2146     Extra Tube hold for  add-on     Comment: Auto resulted       Red Top [[id number]] Collected: 04/14/25 2129    Specimen: Blood Updated: 04/14/25 2146     Extra Tube Hold for add-ons.     Comment: Auto resulted.       Gray Top [[id number]] Collected: 04/14/25 2129    Specimen: Blood Updated: 04/14/25 2146     Extra Tube Hold for add-ons.     Comment: Auto resulted.       Light Blue Top [[id number]] Collected: 04/14/25 2129    Specimen: Blood Updated: 04/14/25 2146     Extra Tube Hold for add-ons.     Comment: Auto resulted       Protime-INR [[id number]]  (Abnormal) Collected: 04/14/25 2129    Specimen: Blood Updated: 04/14/25 2145     Protime 16.3 Seconds      INR 1.25    CBC & Differential [[id number]]  (Abnormal) Collected: 04/14/25 2129    Specimen: Blood Updated: 04/14/25 2136    Narrative:      The following orders were created for panel order CBC & Differential.  Procedure                               Abnormality         Status                     ---------                               -----------         ------                     CBC Auto Differential[[id number]]        Abnormal            Final result                 Please view results for these tests on the individual orders.    CBC Auto Differential [[id number]]  (Abnormal) Collected: 04/14/25 2129    Specimen: Blood Updated: 04/14/25 2136     WBC 19.87 10*3/mm3      RBC 4.67 10*6/mm3      Hemoglobin 13.8 g/dL      Hematocrit 42.7 %      MCV 91.4 fL      MCH 29.6 pg      MCHC 32.3 g/dL      RDW 13.1 %      RDW-SD 43.9 fl      MPV 9.8 fL      Platelets 181 10*3/mm3      Neutrophil % 81.1 %      Lymphocyte % 11.5 %      Monocyte % 6.2 %      Eosinophil % 0.1 %      Basophil % 0.3 %      Immature Grans % 0.8 %      Neutrophils, Absolute 16.13 10*3/mm3      Lymphocytes, Absolute 2.29 10*3/mm3      Monocytes, Absolute 1.23 10*3/mm3      Eosinophils, Absolute 0.01 10*3/mm3      Basophils, Absolute 0.06 10*3/mm3      Immature Grans, Absolute 0.15 10*3/mm3      nRBC 0.0 /100 WBC            Imaging Results (Last 72 Hours)       Procedure Component Value Units Date/Time    CT Cervical Spine Without Contrast [[id number]] Collected: 04/15/25 0710     Updated: 04/15/25 0718    Narrative:      EXAMINATION:  CT CERVICAL SPINE WO CONTRAST-  4/14/2025 10:15 PM     HISTORY: Fall with unknown loss consciousness. Rule out fracture.     COMPARISON: No comparison.      DOSE LENGTH PRODUCT: 894.33 mGy.cm Automated exposure control was also  utilized to decrease patient radiation dose.     TECHNIQUE: Serial helical tomographic images of the cervical spine were  obtained without the use of intravenous contrast. Sagittal and coronal  reformatted images were also provided.     FINDINGS:     ALIGNMENT AND ADDITIONAL FINDINGS: There is 2 to 3 mm of anterior  subluxation of C4 compared to C5, degenerative. The alignment is  otherwise normal.     BONES: There is no evidence of fracture. Vertebral body heights are  maintained.     DISC SPACES:     C2-3: There is mild disc narrowing. There is a central disc osteophyte  complex producing minimal dural sac compression. There is facet  arthropathy right greater than left. There is no significant spinal or  foraminal narrowing.     C3-4: There is moderate disc narrowing. There is spondylitic and  uncinate spurring. There is severe uncinate spurring on the right. There  is severe facet arthropathy on the right and mild to moderate facet  arthropathy on the left. There is severe right-sided foraminal  narrowing. There is mild foraminal narrowing on the left.     C4-5: The disc is fairly well maintained in height. There is spondylitic  and uncinate spurring. There is moderate to severe facet arthropathy.  There is severe right and moderate to severe left-sided foraminal  narrowing.     C5-6: There is minimal disc narrowing. There is mild uncinate spurring.  There is mild to moderate facet arthropathy. There is mild to moderate  foraminal narrowing on the right.     C6-7: There is  severe disc narrowing. There is spondylitic and uncinate  spurring. There is mild facet arthropathy left greater than right. There  is severe bilateral foraminal narrowing.     C7-T1: The disc is maintained in height. There is mild facet arthropathy  bilaterally. There is no significant spinal or foraminal narrowing.     T1-2: There is severe disc narrowing. There is mild to moderate facet  arthropathy. There is spondylitic and uncinate spurring. There is  moderate to severe foraminal narrowing right greater than left.          Impression:      1. No evidence of cervical spine fracture.  2. Degenerative changes, as described.           This report was signed and finalized on 4/15/2025 7:15 AM by Dr. Rosendo Natarajan MD.       CT Head Without Contrast [[id number]] Collected: 04/15/25 0656     Updated: 04/15/25 0703    Narrative:      EXAM/TECHNIQUE: CT head without contrast     INDICATION: Fall with unknown loss consciousness     COMPARISON: None available.     DLP: 894.33 mGy.cm. Automated exposure control was utilized to decrease  patient radiation dose.     FINDINGS:     No evidence of intracranial hemorrhage. Gray-white differentiation is  maintained. Global cerebral volume loss and presumed chronic  microvascular ischemic white matter change. No midline shift or mass  effect. Lateral ventricles are nondilated. Basilar cisterns are patent.  No acute orbital finding. Mastoid air cells are clear. Visualized  paranasal sinuses are clear. No acute osseous finding.       Impression:         1. No acute intracranial findings.  2. Global cerebral volume loss and presumed chronic microvascular  ischemic white matter change.        These findings are in agreement with the critical and emergent findings  from the [facility] preliminary report.     This report was signed and finalized on 4/15/2025 7:00 AM by Dr. Mick Owens MD.       XR Chest 1 View [[id number]] Collected: 04/14/25 2138     Updated: 04/14/25 2143     Narrative:      EXAMINATION: XR CHEST 1 VW-  4/14/2025 9:39 PM     HISTORY: Shortness of breath. Edema.     FINDINGS: Upright frontal projection of the chest demonstrates elevation  of the right diaphragm with right basilar atelectasis. Lungs are  otherwise clear. No evidence of cardiac enlargement or vascular  redistribution to suggest congestive change. There is no effusion or  free air present.       Impression:      1.. Elevated right diaphragm with right basilar atelectasis. Lungs are  otherwise clear.     This report was signed and finalized on 4/14/2025 9:40 PM by Dr. Stevenson Spears MD.                  ASSESSMENT/PLAN     Active Hospital Problems    Diagnosis     **Cellulitis      Assessment:  Ms. Melchor is a 89 y.o. female with past medical history of A-fib, DM, HTN, HLD who presents with infected sacral ulcer now status post operative debridement on 4/15.    Sacral packing removed today.  Will continue wet-to-dry packing for now.  Follow-up wound cultures, no need for more than 4 days antibiotic therapy.  Wound ostomy consult to take over management of sacral wound.    Recommendations:  Follow-up wound cultures  No need for more than 4 days antibiotic therapy now that source control obtained  Wet-to-dry packing to sacral wound twice daily   Wound ostomy consult for long-term management of wound    General Surgery will sign off at this time.  Please call or page with additional questions/concerns.

## 2025-04-16 NOTE — THERAPY WOUND CARE TREATMENT
Acute Care - Wound/Debridement Initial Evaluation  [facility]     Patient Name: BRANDON Melchor  : 1928  MRN: [id number]  Today's Date: 2025                Admit Date: 2025    Visit Dx:    ICD-10-CM ICD-9-CM   1. Cellulitis of buttock  L03.317 682.5   2. Atrial fibrillation with rapid ventricular response  I48.91 427.31   3. Hyponatremia  E87.1 276.1   4. Wound of sacral region  S31.000A 959.19   5. Impaired mobility [Z74.09]  Z74.09 799.89       Patient Active Problem List   Diagnosis    Cellulitis        Past Medical History:   Diagnosis Date    A-fib     Diabetes mellitus     Glaucoma     Hyperlipidemia     Hypertension         Past Surgical History:   Procedure Laterality Date    HYSTERECTOMY      KNEE SURGERY Right     TOE SURGERY Right     WOUND DEBRIDEMENT N/A 4/15/2025    Procedure: INCISION AND DEBRIDEMENT SACRAL WOUND;  Surgeon: Dav Trevino MD;  Location: [facility];  Service: General;  Laterality: N/A;           Wound 04/15/25 1337 coccyx Surgical (Active)   Wound Image   25 1325   Dressing Appearance dry;intact;moist drainage;no drainage 25 132   Closure Open to air 25 132   Base red;pink;moist;maroon/purple;epithelialization;dry 25 1325   Red (%), Wound Tissue Color 99 25 1325   Yellow (%), Wound Tissue Color 1 25 1325   Periwound intact;dry;pink;redness;warm 25 1325   Periwound Temperature warm 25 1325   Periwound Skin Turgor firm;soft 25 1325   Edges open 25 1325   Wound Length (cm) 2.4 cm 25 1325   Wound Width (cm) 1.7 cm 25 1325   Wound Depth (cm) 1.8 cm 25 1325   Wound Surface Area (cm^2) 3.2 cm^2 25 1325   Wound Volume (cm^3) 3.845 cm^3 25 1325   Drainage Characteristics/Odor bleeding controlled;sanguineous;serosanguineous 25 1325   Drainage Amount none;scant 25 1325   Care, Wound negative pressure wound therapy;pressure applied;moist wound environment maintained  04/16/25 1325   Dressing Care dressing applied;packed with;foam 04/16/25 1325   Periwound Care dry periwound area maintained 04/16/25 1325   Wound Output (mL) 0 04/16/25 1325       NPWT (Negative Pressure Wound Therapy) 04/16/25 1325 (Active)   Therapy Setting continuous therapy 04/16/25 1325   Dressing foam, black;transparent dressing 04/16/25 1325   Pressure Setting 125 mmHg 04/16/25 1325   Sponges Inserted 1 04/16/25 1325   Finger sweep complete Yes 04/16/25 1325         WOUND DEBRIDEMENT                     PT Assessment (Last 12 Hours)       PT Evaluation and Treatment       Row Name 04/16/25 1325          Wound 04/15/25 1337 coccyx Surgical    Wound - Properties Group Placement Date: 04/15/25  -EP Placement Time: 1337  -EP Present on Original Admission: N  -EP Location: coccyx  -EP Primary Wound Type: Surgical  -EP    Wound Image Images linked: 1  -AJ     Dressing Appearance dry;intact;moist drainage;no drainage  -AJ     Closure Open to air  -AJ     Base red;pink;moist;maroon/purple;epithelialization;dry  -AJ     Red (%), Wound Tissue Color 99  -AJ     Yellow (%), Wound Tissue Color 1  -AJ     Periwound intact;dry;pink;redness;warm  -AJ     Periwound Temperature warm  -AJ     Periwound Skin Turgor firm;soft  -AJ     Edges open  -AJ     Wound Length (cm) 2.4 cm  -AJ     Wound Width (cm) 1.7 cm  -AJ     Wound Depth (cm) 1.8 cm  -AJ     Wound Surface Area (cm^2) 3.2 cm^2  -AJ     Wound Volume (cm^3) 3.845 cm^3  -AJ     Undermining [Depth (cm)/Location] --   3.0-4.6 cm from 11-5 on clock  -AJ     Drainage Characteristics/Odor bleeding controlled;sanguineous;serosanguineous  -AJ     Drainage Amount none;scant  -AJ     Care, Wound negative pressure wound therapy;pressure applied;moist wound environment maintained  -AJ     Dressing Care dressing applied;packed with;foam  -AJ     Periwound Care dry periwound area maintained  -AJ     Wound Output (mL) 0  -AJ     Retired Wound - Properties Group Placement Date: 04/15/25   -EP Placement Time: 1337  -EP Present on Original Admission: N  -EP Location: coccyx  -EP    Retired Wound - Properties Group Placement Date: 04/15/25  -EP Placement Time: 1337  -EP Present on Original Admission: N  -EP Location: coccyx  -EP    Retired Wound - Properties Group Date first assessed: 04/15/25  -EP Time first assessed: 1337  -EP Present on Original Admission: N  -EP Location: coccyx  -EP      Row Name 04/16/25 1325          NPWT (Negative Pressure Wound Therapy) 04/16/25 1325    NPWT (Negative Pressure Wound Therapy) - Properties Group Placement Date: 04/16/25  -AJ Placement Time: 1325  -AJ    Therapy Setting continuous therapy  -AJ     Dressing foam, black;transparent dressing  -     Pressure Setting 125 mmHg  -AJ     Sponges Inserted 1  -AJ     Finger sweep complete Yes  -AJ     Retired NPWT (Negative Pressure Wound Therapy) - Properties Group Placement Date: 04/16/25  -AJ Placement Time: 1325  -AJ    Retired NPWT (Negative Pressure Wound Therapy) - Properties Group Placement Date: 04/16/25  -AJ Placement Time: 1325  -AJ    Retired NPWT (Negative Pressure Wound Therapy) - Properties Group Placement Date: 04/16/25  -AJ Placement Time: 1325  -AJ      Row Name 04/16/25 1325          Plan of Care Review    Plan of Care Reviewed With patient  -     Outcome Evaluation PT re-eval complete for wound care and NPWT placement. The pt educated pt on role and benefits of NPWT and was agreeable. Pt was rolled onto R side to visualize wound bed. The wound measures at 2.4 cm x 1.7 cm x 1.8 cm and appeared red with granulating tissue below, bleeding controlled. The wound has undermining from 11-5 using clock method and ranged from 3.0-4.6 cm, most notable at 1 o clock. The wound was picture framed in attempt to maintain seal. 1 black foam piece placed directly to wound bed with use of q-tip probe to place foam into undermining and covered with tegaderm. Pressure applied and required reinforcements to maintain  seal due to location. Nsg educated to place wet to dry or attempt sealing if seal was lost. The wound vac will be changed on M,W,F or PRN if seal is lost.  -       Row Name 04/16/25 1325          Physical Therapy Goals    Wound Management Goal Selection (PT) wound management, PT goal 1;wound management, PT goal 2;wound management, PT goal 3  -       Row Name 04/16/25 1325          Wound Management Goal 1 (PT)    Wound Management Goal (Wound Goal 1, PT) The pt will hace decrease in wound length from 2.4 cm to 1.4 cm or less  -AJ     Time Frame (Wound Goal 1, PT) long-term goal (LTG);by discharge  -AJ     Progress/Outcomes (Wound Goal 1, PT) other (see comments)  new goal  -       Row Name 04/16/25 1325          Wound Management Goal 2 (PT)    Wound Management Goal (Wound Goal 2, PT) The pt will hace decrease in wound width from 1.7cm to 1 cm or less as needed to promote wound healing  -AJ     Time Frame (Wound Goal 2, PT) long-term goal (LTG);by discharge  -AJ     Progress/Outcomes (Wound Goal 2, PT) other (see comments)  new goal  -       Row Name 04/16/25 1325          Wound Management Goal 3 (PT)    Wound Management Goal (Wound Goal 3, PT) The wound bed will appear 100% red as needed to maintain healthy wound environment and return to PLOF  -AJ     Time Frame (Wound Goal 3, PT) long-term goal (LTG);by discharge  -AJ     Progress/Outcomes (Wound Goal 3, PT) other (see comments)  new goal  -               User Key  (r) = Recorded By, (t) = Taken By, (c) = Cosigned By      Initials Name Provider Type    Piyush Snow, RN Registered Nurse    Minor Mcdaniel, PT DPT Physical Therapist                  Physical Therapy Education       Title: PT OT SLP Therapies (Done)       Topic: Physical Therapy (Done)       Point: Mobility training (Done)       Learning Progress Summary            Patient Acceptance, E, VU,NR by MAHI at 4/16/2025 4144    Comment: educated on role and benefit of wound vac, and its healing  purposes. wet to dry if wound vac comes off or seal is lost    Acceptance, E, VU,NR by AJ at 4/16/2025 1046    Comment: role of PT, call for assist, goal setting, d/c planning                      Point: Home exercise program (Done)       Learning Progress Summary            Patient Acceptance, E, VU,NR by AJ at 4/16/2025 1450    Comment: educated on role and benefit of wound vac, and its healing purposes. wet to dry if wound vac comes off or seal is lost    Acceptance, E, VU,NR by AJ at 4/16/2025 1046    Comment: role of PT, call for assist, goal setting, d/c planning                      Point: Body mechanics (Done)       Learning Progress Summary            Patient Acceptance, E, VU,NR by MAHI at 4/16/2025 1450    Comment: educated on role and benefit of wound vac, and its healing purposes. wet to dry if wound vac comes off or seal is lost    Acceptance, E, VU,NR by MAHI at 4/16/2025 1046    Comment: role of PT, call for assist, goal setting, d/c planning                      Point: Precautions (Done)       Learning Progress Summary            Patient Acceptance, E, VU,NR by AJ at 4/16/2025 1450    Comment: educated on role and benefit of wound vac, and its healing purposes. wet to dry if wound vac comes off or seal is lost    Acceptance, E, VU,NR by  at 4/16/2025 1046    Comment: role of PT, call for assist, goal setting, d/c planning                                      User Key       Initials Effective Dates Name Provider Type Discipline     08/15/24 -  Minor Goss, PT DPT Physical Therapist PT                    Recommendation and Plan  Anticipated Discharge Disposition (PT): skilled nursing facility  Planned Therapy Interventions (PT): balance training, bed mobility training, gait training, home exercise program, patient/family education, strengthening, ROM (range of motion), transfer training  Therapy Frequency (PT): 2 times/day  Plan of Care Reviewed With: patient  Plan of Care Reviewed With: patient            Outcome Evaluation: PT re-eval complete for wound care and NPWT placement. The pt educated pt on role and benefits of NPWT and was agreeable. Pt was rolled onto R side to visualize wound bed. The wound measures at 2.4 cm x 1.7 cm x 1.8 cm and appeared red with granulating tissue below, bleeding controlled. The wound has undermining from 11-5 using clock method and ranged from 3.0-4.6 cm, most notable at 1 o clock. The wound was picture framed in attempt to maintain seal. 1 black foam piece placed directly to wound bed with use of q-tip probe to place foam into undermining and covered with tegaderm. Pressure applied and required reinforcements to maintain seal due to location. Nsg educated to place wet to dry or attempt sealing if seal was lost. The wound vac will be changed on M,W,F or PRN if seal is lost.  Plan of Care Reviewed With: patient            Time Calculation   PT Charges       Row Name 04/16/25 1325 04/16/25 0849          Time Calculation    Start Time 1325  -AJ 0849  -     Stop Time 1420  -AJ 0913  +5 for chart review  -     Time Calculation (min) 55 min  -AJ 24 min  -AJ     PT Received On 04/16/25  -AJ 04/16/25  -AJ     PT Goal Re-Cert Due Date 04/26/25  -AJ 04/26/25  -AJ        Untimed Charges    PT Eval/Re-eval Minutes 30  -AJ 29  -AJ     Wound Care 83968 Neg Press (DME) wound TO 50 sqcm  -AJ --     [id number] Pressure wound to 50 sqcm 25  -AJ --        Total Minutes    Untimed Charges Total Minutes 55  -AJ 29  -AJ      Total Minutes 55  -AJ 29  -AJ               User Key  (r) = Recorded By, (t) = Taken By, (c) = Cosigned By      Initials Name Provider Type    AJ Minor Goss, PT DPT Physical Therapist                      Therapy Charges for Today       Code Description Service Date Service Provider Modifiers Qty    22483158339 HC PT EVAL MOD COMPLEXITY 2 4/16/2025 Minor Goss, PT DPT GP 1    73305579838 HC PT RE-EVAL ESTABLISHED PLAN 2 4/16/2025 Minor Goss, PT DPT GP 1    [id number]  HC PT NEG PRESS WOUND TO 50SQCM DME2 4/16/2025 Minor Goss, PT DPT GP 1              PT G-Codes  Outcome Measure Options: AM-PAC 6 Clicks Basic Mobility (PT)  AM-PAC 6 Clicks Score (PT): 15  AM-PAC 6 Clicks Score (OT): 13       Minor Goss PT DPT  4/16/2025

## 2025-04-16 NOTE — PROGRESS NOTES
[facility] -  Progress Note     LOS: 1 day   Patient Care Team:  Reginaldo Uribe MD as PCP - General (Family Medicine)    Chief Complaint: A-fib follow-up    Subjective     Interval History: The patient feels much improved this morning.  She is off supplemental oxygen and denies any shortness of breath.  She endorses significant urinary output yesterday.  Blood pressure is running high and heart rate remains elevated as she has been in persistent atrial fibrillation.  She is asymptomatic at this time with heart rates in the 120s.  She was taken to the operating room for debridement of her wound.  She has mild pain at that site but no significant cardiac complaints this morning and overall is feeling much improved.  She does indicate nausea and abdominal discomfort.    Telemetry: Atrial fibrillation with rapid ventricular response heart rates 120-140    Review of Systems:     Review of Systems   Constitutional:  Positive for appetite change.   Respiratory:  Negative for apnea, cough, chest tightness, shortness of breath and wheezing.    Cardiovascular:  Negative for chest pain, palpitations and leg swelling.   Gastrointestinal:  Positive for abdominal pain and nausea. Negative for vomiting.   Genitourinary:  Negative for decreased urine volume, difficulty urinating and dysuria.   Neurological:  Positive for weakness. Negative for dizziness, light-headedness and headaches.   Psychiatric/Behavioral:  Negative for agitation and confusion. The patient is not nervous/anxious.      Objective     Vital Sign Min/Max for last 24 hours  Temp  Min: 97.5 °F (36.4 °C)  Max: 99.6 °F (37.6 °C)   BP  Min: 132/83  Max: 188/98   Pulse  Min: 92  Max: 131   Resp  Min: 17  Max: 25   SpO2  Min: 91 %  Max: 98 %   No data recorded   No data recorded         04/15/25  0500   Weight: 80.9 kg (178 lb 5.6 oz)       Physical Exam:    Vitals reviewed.   Constitutional:       General: Awake. Not in acute  distress.     Appearance: Well-developed, well-groomed, overweight and not in distress. Chronically ill-appearing.   HENT:      Head: Normocephalic and atraumatic.   Pulmonary:      Effort: Pulmonary effort is normal.      Breath sounds: Normal breath sounds.   Cardiovascular:      Tachycardia present. Irregularly irregular rhythm.   Edema:     Pretibial: bilateral trace edema of the pretibial area.  Musculoskeletal:      Cervical back: Normal range of motion and neck supple. Skin:     General: Skin is warm and dry.   Neurological:      Mental Status: Alert, oriented to person, place, and time and oriented to person, place and time.   Psychiatric:         Attention and Perception: Attention normal.         Mood and Affect: Mood normal.         Speech: Speech normal.         Behavior: Behavior normal. Behavior is cooperative.         Thought Content: Thought content normal.         Cognition and Memory: Cognition and memory normal.         Judgment: Judgment normal.       Results Review:   Lab Results (last 72 hours)      Lab Results   Component Value Date    WBC 16.14 (H) 04/16/2025    HGB 14.9 04/16/2025    HCT 45.4 04/16/2025    MCV 89.7 04/16/2025     04/16/2025     Lab Results   Component Value Date    GLUCOSE 219 (H) 04/16/2025    CALCIUM 9.3 04/16/2025     (L) 04/16/2025    K 3.0 (L) 04/16/2025    CO2 27.0 04/16/2025    CL 93 (L) 04/16/2025    BUN 17 04/16/2025    CREATININE 0.57 04/16/2025    EGFR 83.3 04/16/2025    BCR 29.8 (H) 04/16/2025    ANIONGAP 14.0 04/16/2025     Wound Culture   Date Value Ref Range Status   04/15/2025 No growth  Preliminary       Medication Review: yes  Current Facility-Administered Medications   Medication Dose Route Frequency Provider Last Rate Last Admin    acetaminophen (TYLENOL) tablet 650 mg  650 mg Oral Q4H PRN Dav Trevino MD   650 mg at 04/15/25 1823    Or    acetaminophen (TYLENOL) 160 MG/5ML oral solution 650 mg  650 mg Oral Q4H PRN Dav Trevino MD         Or    acetaminophen (TYLENOL) suppository 650 mg  650 mg Rectal Q4H PRN Dav Trevino MD        sennosides-docusate (PERICOLACE) 8.6-50 MG per tablet 2 tablet  2 tablet Oral BID PRN Dav Trevino MD        And    polyethylene glycol (MIRALAX) packet 17 g  17 g Oral Daily PRN Dav Trevino MD        And    bisacodyl (DULCOLAX) EC tablet 5 mg  5 mg Oral Daily PRN Dav Trevino MD        And    bisacodyl (DULCOLAX) suppository 10 mg  10 mg Rectal Daily PRN Dav Trevino MD        Calcium Replacement - Follow Nurse / BPA Driven Protocol   Not Applicable PRN Abdelrahman Mohamud MD        carvedilol (COREG) tablet 12.5 mg  12.5 mg Oral BID With Meals Claire Jaeger, APRMELVI        ceFAZolin 2000 mg IVPB in 100 mL NS (MBP)  2,000 mg Intravenous Q8H Dav Trevino MD   2,000 mg at 04/16/25 0241    citalopram (CeleXA) tablet 10 mg  10 mg Oral Daily Dav Trevino MD   10 mg at 04/16/25 0841    enoxaparin sodium (LOVENOX) syringe 40 mg  40 mg Subcutaneous Q24H Abdelrahman Mohamud MD   40 mg at 04/15/25 1514    furosemide (LASIX) injection 40 mg  40 mg Intravenous BID Diuretics Dav Trevino MD   40 mg at 04/16/25 0842    losartan (COZAAR) tablet 50 mg  50 mg Oral Q24H Abdelrahman Mohamud MD        Magnesium Low Dose Replacement - Follow Nurse / BPA Driven Protocol   Not Applicable PRN Abdelrahman Mohamud MD        nitroglycerin (NITROSTAT) SL tablet 0.4 mg  0.4 mg Sublingual Q5 Min PRN Dav Trevino MD        Phosphorus Replacement - Follow Nurse / BPA Driven Protocol   Not Applicable PRN Abdelrahman Mohamud MD        potassium chloride (KLOR-CON M20) CR tablet 40 mEq  40 mEq Oral Q4H Abdelrahman Mohamud MD        Potassium Replacement - Follow Nurse / BPA Driven Protocol   Not Applicable PRN Abdelrahman Mohamud MD        sodium chloride 0.9 % flush 10 mL  10 mL Intravenous PRN Dav Trevino MD        sodium chloride 0.9 % flush 10 mL  10 mL Intravenous Q12H Dav Trevino MD   10 mL at 04/16/25 0847    sodium chloride  0.9 % flush 10 mL  10 mL Intravenous PRN Dav Trevino MD        sodium chloride 0.9 % infusion 40 mL  40 mL Intravenous PRN Dav Trevino MD        spironolactone (ALDACTONE) tablet 25 mg  25 mg Oral Daily Claire Jaeger, APRN         Results for orders placed during the hospital encounter of 04/14/25    Adult Transthoracic Echo Complete W/ Cont if Necessary Per Protocol    Interpretation Summary    Left ventricular systolic function is normal. Left ventricular ejection fraction appears to be 56 - 60%.    Left ventricular wall thickness is consistent with mild concentric hypertrophy.    Normal right ventricular cavity size noted. Borderline low right ventricular systolic function noted.    No significant (greater than mild) valvular abnormalities identified on this study.        Assessment & Plan     1.  Atrial fibrillation with rapid ventricular response: Asymptomatic with tachycardic rates this morning.  Medication adjustments have been made for rate control.  Chronicity unknown.  Not anticoagulated due to recent falls, advanced age, and previous history of gastritis.    2.  Hypertension: Elevated.  Home medications have been resumed.  Beta-blocker therapy has been added.    Discontinue metoprolol.  Initiate carvedilol 12.5 mg twice daily and increase for   rate control as heart rate and blood pressure will allow.  Add spironolactone 25 mg daily.  Discontinue furosemide.    3.  Hypokalemia: Replacement ordered.  Loop diuretics discontinued.  Aldactone added.  4.  Chronic hyponatremia: Stable.  5.  Wound: Status post debridement postop day 1 receiving antibiotics.  6.  Fall: Found down at home prior to arrival.  7.  Advanced age      Commend medication adjustments for management of hypertension and heart rates.  Discontinue loop diuretics and add spironolactone to aid in volume management, blood pressure control, and hypokalemia.  Given recent fall, advanced age, and history of gastritis long-term  anticoagulation would be high risk for this patient.  She is currently on VTE dosing of Lovenox        Electronically signed by AGUSTIN Bardales, 04/16/25, 10:45 AM CDT.

## 2025-04-16 NOTE — THERAPY EVALUATION
Patient Name: BRANDON Melchor  : 1928    MRN: [id number]                              Today's Date: 2025       Admit Date: 2025    Visit Dx:     ICD-10-CM ICD-9-CM   1. Cellulitis of buttock  L03.317 682.5   2. Atrial fibrillation with rapid ventricular response  I48.91 427.31   3. Hyponatremia  E87.1 276.1   4. Wound of sacral region  S31.000A 959.19   5. Impaired mobility [Z74.09]  Z74.09 799.89     Patient Active Problem List   Diagnosis    Cellulitis     Past Medical History:   Diagnosis Date    A-fib     Diabetes mellitus     Glaucoma     Hyperlipidemia     Hypertension      Past Surgical History:   Procedure Laterality Date    HYSTERECTOMY      KNEE SURGERY Right     TOE SURGERY Right     WOUND DEBRIDEMENT N/A 4/15/2025    Procedure: INCISION AND DEBRIDEMENT SACRAL WOUND;  Surgeon: Dav Trevino MD;  Location: [facility] OR;  Service: General;  Laterality: N/A;      General Information       Row Name 25 0849          Physical Therapy Time and Intention    Document Type evaluation  pt presents to ED after being found down at home, Dx; cellulitis and generalized weakness. Pt also had I&D to sacral wound on 4/15. h/o afib, DM, Htn, dyslipidemia  -AJ     Mode of Treatment physical therapy  -AJ       Row Name 25 0849          General Information    Patient Profile Reviewed yes  -AJ     Prior Level of Function min assist:;transfer;ADL's;all household mobility;community mobility;gait;home management   pt was conversationally confused throughout the session, unsure true accuracy of PLOF  -AJ     Existing Precautions/Restrictions fall;other (see comments)  sacral wound  -AJ     Barriers to Rehab medically complex;cognitive status;previous functional deficit  -AJ       Row Name 25 0849          Living Environment    Current Living Arrangements home  -AJ     People in Home other relative(s)  -AJ       Row Name 2549          Home Main Entrance    Number of Stairs, Main  Entrance four  -     Stair Railings, Main Entrance railings on both sides of stairs;railings not in good condition, need repair for safe use  -       Row Name 04/16/25 0849          Stairs Within Home, Primary    Number of Stairs, Within Home, Primary none  -       Row Name 04/16/25 0849          Cognition    Orientation Status (Cognition) oriented to;person;disoriented to;place;situation;time  -       Row Name 04/16/25 0849          Safety Issues/Impairments Affecting Functional Mobility    Safety Issues Affecting Function (Mobility) friction/shear risk;impulsivity;insight into deficits/self-awareness;safety precaution awareness;safety precautions follow-through/compliance  -     Impairments Affecting Function (Mobility) balance;cognition;endurance/activity tolerance;pain;strength  -     Cognitive Impairments, Mobility Safety/Performance awareness, need for assistance;impulsivity;insight into deficits/self-awareness;judgment;problem-solving/reasoning;safety precaution awareness;safety precaution follow-through  -               User Key  (r) = Recorded By, (t) = Taken By, (c) = Cosigned By      Initials Name Provider Type    Minor Mcdaniel, PT DPT Physical Therapist                   Mobility       Row Name 04/16/25 0849          Bed Mobility    Bed Mobility rolling left;supine-sit  -     Rolling Left [place] (Bed Mobility) minimum assist (75% patient effort)  -     Supine-Sit [place] (Bed Mobility) minimum assist (75% patient effort);verbal cues;nonverbal cues (demo/gesture)  -     Assistive Device (Bed Mobility) bed rails;head of bed elevated  -       Row Name 04/16/25 0849          Bed-Chair Transfer    Bed-Chair [place] (Transfers) minimum assist (75% patient effort);2 person assist  -     Comment, (Bed-Chair Transfer) HHAx2  -       Row Name 04/16/25 0849          Sit-Stand Transfer    Sit-Stand [place] (Transfers) minimum assist (75% patient effort)  -      Comment, (Sit-Stand Transfer) HHAx2  -       Row Name 04/16/25 0849          Gait/Stairs (Locomotion)    [place] Level (Gait) minimum assist (75% patient effort)  -     Patient was able to Ambulate yes  -AJ     Distance in Feet (Gait) 5  -AJ     Deviations/Abnormal Patterns (Gait) bilateral deviations  -     Bilateral Gait Deviations forward flexed posture  -               User Key  (r) = Recorded By, (t) = Taken By, (c) = Cosigned By      Initials Name Provider Type    Minor Mcdaniel PT DPT Physical Therapist                   Obj/Interventions       Row Name 04/16/25 0849          Range of Motion Comprehensive    General Range of Motion bilateral lower extremity ROM WFL  -     Comment, General Range of Motion no formal ROM or MMT due to cognition ,remained WFL and t/f  -AJ       Row Name 04/16/25 0849          Strength Comprehensive (MMT)    General Manual Muscle Testing (MMT) Assessment no strength deficits identified  -     Comment, General Manual Muscle Testing (MMT) Assessment no formal ROM or MMT due to cognition ,remained WFL and t/f  -AJ       Row Name 04/16/25 0849          Balance    Balance Assessment sitting static balance;sitting dynamic balance;standing static balance;standing dynamic balance  -     Static Sitting Balance standby assist  -     Dynamic Sitting Balance contact guard  -     Position, Sitting Balance unsupported;sitting edge of bed  -     Static Standing Balance minimal assist  -AJ     Dynamic Standing Balance minimal assist;2-person assist  -     Position/Device Used, Standing Balance supported;other (see comments)  A  -     Balance Interventions sitting;standing;sit to stand;supported;static;dynamic  -               User Key  (r) = Recorded By, (t) = Taken By, (c) = Cosigned By      Initials Name Provider Type    Minor Mcdaniel PT DPT Physical Therapist                   Goals/Plan       Row Name 04/16/25 0849          Bed Mobility Goal 1 (PT)     Activity/Assistive Device (Bed Mobility Goal 1, PT) bed mobility activities, all  -AJ     [place] Level/Cues Needed (Bed Mobility Goal 1, PT) contact guard required;verbal cues required  -AJ     Time Frame (Bed Mobility Goal 1, PT) long term goal (LTG);10 days  -AJ     Progress/Outcomes (Bed Mobility Goal 1, PT) new goal  -AJ       Row Name 04/16/25 0849          Transfer Goal 1 (PT)    Activity/Assistive Device (Transfer Goal 1, PT) transfers, all  -AJ     [place] Level/Cues Needed (Transfer Goal 1, PT) standby assist  -AJ     Time Frame (Transfer Goal 1, PT) long term goal (LTG);10 days  -AJ     Progress/Outcome (Transfer Goal 1, PT) new goal  -AJ       Row Name 04/16/25 0849          Gait Training Goal 1 (PT)    Activity/Assistive Device (Gait Training Goal 1, PT) gait (walking locomotion);decrease asymmetrical patterns;decrease fall risk;diminish gait deviation;forward stepping;improve balance and speed;increase endurance/gait distance;increase energy conservation;assistive device use;walker, rolling  -AJ     [place] Level (Gait Training Goal 1, PT) minimum assist (75% or more patient effort)  -AJ     Distance (Gait Training Goal 1, PT) 25' with no pain reports  -AJ     Time Frame (Gait Training Goal 1, PT) long term goal (LTG);10 days  -AJ     Progress/Outcome (Gait Training Goal 1, PT) new goal  -AJ       Row Name 04/16/25 0849          Balance Goal 1 (PT)    Activity/Assistive Device (Balance Goal) sitting static balance;sitting dynamic balance;sit to stand dynamic balance;standing static balance;standing dynamic balance;with functional activities/occupations;with functional mobility activities;with functional reaching activities;supported;walker, rolling  -AJ     [place] Level/Cues Needed (Balance Goal 1, PT) contact guard required  -AJ     Time Frame (Balance Goal 1, PT) long-term goal (LTG);by discharge  -AJ     Progress/Outcomes (Balance Goal 1, PT) other (see comments)  new goal  -AJ        Row Name 04/16/25 0849          Therapy Assessment/Plan (PT)    Planned Therapy Interventions (PT) balance training;bed mobility training;gait training;home exercise program;patient/family education;strengthening;ROM (range of motion);transfer training  -               User Key  (r) = Recorded By, (t) = Taken By, (c) = Cosigned By      Initials Name Provider Type    Minor Mcdaniel, PT DPT Physical Therapist                   Clinical Impression       Row Name 04/16/25 0849          Pain    Pretreatment Pain Rating 3/10  -     Posttreatment Pain Rating 5/10  -     Pain Location buttock  -     Pain Side/Orientation upper  -     Pain Management Interventions exercise or physical activity utilized;nursing notified  -     Response to Pain Interventions no change per patient report  -       Row Name 04/16/25 0849          Plan of Care Review    Plan of Care Reviewed With patient  -     Outcome Evaluation PT eval complete. pt AO to self only but easily redirectable, but is somewhat conversationally confused.Per pt, she is usually very indep at baseline and uses a walker but due to cognition, unsure of accuracy. Pt was able to perform bed mobility with Min Ax2, stood and ambulated short distance to recliner before sitting. Pt complains of continued pain across incision after yesterday I&D but was ensured, it was ok to be OOB on waffle cushion. Pt left in recliner and will benefit from skilled PT services to improve t/f, decrease fall risk, gait safety and returning to PLOF. Recommend SNF when medically stable.  -       Row Name 04/16/25 0849          Therapy Assessment/Plan (PT)    Patient/Family Therapy Goals Statement (PT) none verbalized  -     Rehab Potential (PT) fair  -     Criteria for Skilled Interventions Met (PT) yes;meets criteria;skilled treatment is necessary  -     Therapy Frequency (PT) 2 times/day  -     Predicted Duration of Therapy Intervention (PT) until d/c  -MAHI Ceja  Name 04/16/25 0849          Positioning and Restraints    Pre-Treatment Position in bed  -AJ     Post Treatment Position chair  -AJ     In Chair notified nsg;call light within reach;encouraged to call for assist;exit alarm on;waffle cushion;legs elevated  -               User Key  (r) = Recorded By, (t) = Taken By, (c) = Cosigned By      Initials Name Provider Type    Minor Mcdaniel PT DPT Physical Therapist                   Outcome Measures       Row Name 04/16/25 0849          How much help from another person do you currently need...    Turning from your back to your side while in flat bed without using bedrails? 3  -AJ     Moving from lying on back to sitting on the side of a flat bed without bedrails? 3  -AJ     Moving to and from a bed to a chair (including a wheelchair)? 3  -AJ     Standing up from a chair using your arms (e.g., wheelchair, bedside chair)? 3  -AJ     Climbing 3-5 steps with a railing? 1  -AJ     To walk in hospital room? 2  -AJ     AM-PAC 6 Clicks Score (PT) 15  -AJ     Highest Level of Mobility Goal 4 --> Transfer to chair/commode  -       Row Name 04/16/25 0849 04/16/25 0840       Functional Assessment    Outcome Measure Options AM-PAC 6 Clicks Basic Mobility (PT)  - AM-PAC 6 Clicks Daily Activity (OT)  -              User Key  (r) = Recorded By, (t) = Taken By, (c) = Cosigned By      Initials Name Provider Type    Minor Mcdaniel PT DPT Physical Therapist    Rozina Andre OTR/L Occupational Therapist                                 Physical Therapy Education       Title: PT OT SLP Therapies (Done)       Topic: Physical Therapy (Done)       Point: Mobility training (Done)       Learning Progress Summary            Patient Acceptance, E, VU,NR by MAHI at 4/16/2025 1046    Comment: role of PT, call for assist, goal setting, d/c planning                      Point: Home exercise program (Done)       Learning Progress Summary            Patient Acceptance, E, VU,NR by MAHI at  4/16/2025 1046    Comment: role of PT, call for assist, goal setting, d/c planning                      Point: Body mechanics (Done)       Learning Progress Summary            Patient Acceptance, E, VU,NR by  at 4/16/2025 1046    Comment: role of PT, call for assist, goal setting, d/c planning                      Point: Precautions (Done)       Learning Progress Summary            Patient Acceptance, E, VU,NR by  at 4/16/2025 1046    Comment: role of PT, call for assist, goal setting, d/c planning                                      User Key       Initials Effective Dates Name Provider Type Discipline     08/15/24 -  Minor Goss, PT DPT Physical Therapist PT                  PT Recommendation and Plan  Planned Therapy Interventions (PT): balance training, bed mobility training, gait training, home exercise program, patient/family education, strengthening, ROM (range of motion), transfer training  Outcome Evaluation: PT eval complete. pt AO to self only but easily redirectable, but is somewhat conversationally confused.Per pt, she is usually very indep at baseline and uses a walker but due to cognition, unsure of accuracy. Pt was able to perform bed mobility with Min Ax2, stood and ambulated short distance to recliner before sitting. Pt complains of continued pain across incision after yesterday I&D but was ensured, it was ok to be OOB on waffle cushion. Pt left in recliner and will benefit from skilled PT services to improve t/f, decrease fall risk, gait safety and returning to PLOF. Recommend SNF when medically stable.     Time Calculation:         PT Charges       Row Name 04/16/25 0849             Time Calculation    Start Time 0849  -AJ      Stop Time 0913  +5 for chart review  -      Time Calculation (min) 24 min  -AJ         Untimed Charges    PT Eval/Re-eval Minutes 29  -AJ         Total Minutes    Untimed Charges Total Minutes 29  -AJ       Total Minutes 29  -AJ                User Key  (r) =  Recorded By, (t) = Taken By, (c) = Cosigned By      Initials Name Provider Type    AJ Minor Goss, PT DPT Physical Therapist                  Therapy Charges for Today       Code Description Service Date Service Provider Modifiers Qty    71443382544 HC PT EVAL MOD COMPLEXITY 2 4/16/2025 Minor Goss, PT DPT GP 1            PT G-Codes  Outcome Measure Options: AM-PAC 6 Clicks Basic Mobility (PT)  AM-PAC 6 Clicks Score (PT): 15  AM-PAC 6 Clicks Score (OT): 13  PT Discharge Summary  Anticipated Discharge Disposition (PT): skilled nursing facility    Minor Goss PT DPT  4/16/2025

## 2025-04-16 NOTE — CASE MANAGEMENT/SOCIAL WORK
Discharge Planning Assessment  [facility]     Patient Name: BRANDON Melchor  MRN: [id number]  Today's Date: 4/16/2025    Admit Date: 4/14/2025        Discharge Needs Assessment       Row Name 04/16/25 1101       Living Environment    People in Home child(sandra), adult;other relative(s)    Current Living Arrangements home    Primary Care Provided by child(sandra)    Provides Primary Care For no one    Family Caregiver if Needed child(sandra), adult    Quality of Family Relationships supportive;helpful;involved    Able to Return to Prior Arrangements yes       Resource/Environmental Concerns    Resource/Environmental Concerns none    Transportation Concerns none       Transportation Needs    In the past 12 months, has lack of transportation kept you from medical appointments or from getting medications? no    In the past 12 months, has lack of transportation kept you from meetings, work, or from getting things needed for daily living? No       Food Insecurity    Within the past 12 months, you worried that your food would run out before you got the money to buy more. Never true    Within the past 12 months, the food you bought just didn't last and you didn't have money to get more. Never true       Transition Planning    Patient/Family Anticipates Transition to long-term care facility    Patient/Family Anticipated Services at Transition skilled nursing    Transportation Anticipated family or friend will provide       Discharge Needs Assessment    Readmission Within the Last 30 Days no previous admission in last 30 days    Current Outpatient/Agency/Support Group skilled nursing facility    Equipment Currently Used at Home walker, rolling;glucometer    Concerns to be Addressed discharge planning;care coordination/care conferences    Do you want help finding or keeping work or a job? I do not need or want help    Do you want help with school or training? For example, starting or completing job training or getting a high  school diploma, GED or equivalent No    Anticipated Changes Related to Illness inability to care for self    Outpatient/Agency/Support Group Needs skilled nursing facility    Discharge Facility/Level of Care Needs nursing facility, skilled    Provided Post Acute Provider List? Yes    Post Acute Provider List Nursing Home    Provided Post Acute Provider Quality & Resource List? Yes    Post Acute Provider Quality and Resource List Nursing Home    Delivered To Support Person    Support Person Son Jeferson Melchor    Method of Delivery Telephone    Offered/Gave Vendor List yes    Current Discharge Risk chronically ill    Discharge Coordination/Progress Patient admitted from home where she resides with her son and daughter in law and other family members.  Patient has been very active for 89 years old.  Patient has been evaluated by therapy who is recommending SNF placement for rehab.  SW spoke with patient's son who is in agreement with SNF/rehab placement.  Proceeding with referrals to [facility].                   Discharge Plan    No documentation.                   Expected Discharge Date and Time       Expected Discharge Date Expected Discharge Time    Apr 18, 2025            Demographic Summary    No documentation.                  Functional Status    No documentation.                  Psychosocial    No documentation.                  Abuse/Neglect    No documentation.                  Legal    No documentation.                  Substance Abuse    No documentation.                  Patient Forms    No documentation.                     ZAYDA AmbrocioW

## 2025-04-16 NOTE — PLAN OF CARE
Goal Outcome Evaluation:  Plan of Care Reviewed With: patient           Outcome Evaluation: OT evaluation completed. Pt is A&O to self only, pt is able to recall various parts of what has happened recently but memory if very jumbled in regards to when/where she was on floor, where she is now. Pt is conversationally confused throughout eval. According to pt, at baseline she requires very little to no assistance with day to day tasks, uses walker and no longer drives/cooks or cleans. Today, she is able to come EOB w/ min A as well as t/f with min A x2 and HHA bed>recliner. Pt w/ reports of minimal pain through sacral wound, which she just underwent I&D for. Pt was dependent to don socks, refusing to eat today reporting she is not hungry. Ot to cont to see pt for deficits, D/C rec SNF for further therapy. Pt left in recliner with chair alarm, cushon and call light. Encouraged pt to call for assistance.    Anticipated Discharge Disposition (OT): skilled nursing facility

## 2025-04-16 NOTE — NURSING NOTE
[facility]  INPATIENT WOUND & OSTOMY CARE    Today's Date: 04/16/25    Patient Name: BRANDON Melchor  MRN: [id number]  CSN: [id number]  PCP: Reginaldo Uribe MD  Attending Provider: Abdelrahman Mohamud MD  Length of Stay: 1    Wound care consulted for surgical wound to right gluteus. Nursing has uploaded picture to chart. Patient was admitted with cellulitis on 4/14/2025. Patient is currently sitting in chair with no family at bedside.     Patient presented with a wound to the right gluteus with cellulitis surrounding the wound and extending to the sacrum and left buttocks after being found down in her bathroom. Patient was unsure how long wound had been present. General surgery was consulted and did a sacral I&D on 4/15/25.      Wound: surgical wound to right buttock  Base: slough, necrotic tissue and subcutaneous tissue  Periwound: red  Edges: open but not attached, undermining is present  Drainage: small and serosanguinous      Wound RN Orders (last 12 hours) (12h ago, onward)       Start     Ordered    04/16/25 1029  Wound Vac  Every Mon-Wed-Fri        Comments: Review Additional Information As Outlined in Process Instructions (Open Order Report to View Full Instructions)   Question Answer Comment   Locations: surgical wound to right gluteus    Settings: Continuous    Pressure: 125 mmHg    Wound Bed Black    Cleanser Cleanse With Normal Saline        04/16/25 1028    04/16/25 1028  Turn Patient  Now Then Every 2 Hours         04/16/25 1028    04/16/25 1028  Elevate Heels Off of Bed  Until Discontinued         04/16/25 1028    04/16/25 1028  Use Seat Cushion When Up In Chair  Continuous         04/16/25 1028    04/16/25 1028  Silicone Border Dressing to Bony Prominences  Per Order Details        Comments: Apply silicone border foam dressing per protocol to bilateral heels for protection.  Nursing to change dressing every 3 days and PRN if soiled. Nursing is to peel back dressing  with every assessment to assess skin underneath dressing. No barrier cream under dressing.    04/16/25 1028    04/16/25 1028  Use Repositioning Wedge to Position Patient  Continuous         04/16/25 1028    Unscheduled  Wound Care  As Needed      Question:  Wound Care Instructions  Answer:  Apply Moisture Barrier After Any Incontinence    04/16/25 1028          Inpatient wound care will continue to follow during hospital stay.  Please contact if any issues or concerns arise.     This document has been electronically signed by Ailyn Bo RN on 4/16/2025 09:48 CDT

## 2025-04-16 NOTE — PLAN OF CARE
Goal Outcome Evaluation:  Plan of Care Reviewed With: patient           Outcome Evaluation: PT eval complete. pt AO to self only but easily redirectable, but is somewhat conversationally confused.Per pt, she is usually very indep at baseline and uses a walker but due to cognition, unsure of accuracy. Pt was able to perform bed mobility with Min Ax2, stood and ambulated short distance to recliner before sitting. Pt complains of continued pain across incision after yesterday I&D but was ensured, it was ok to be OOB on waffle cushion. Pt left in recliner and will benefit from skilled PT services to improve t/f, decrease fall risk, gait safety and returning to PLOF. Recommend SNF when medically stable.    Anticipated Discharge Disposition (PT): skilled nursing facility

## 2025-04-17 LAB
ANION GAP SERPL CALCULATED.3IONS-SCNC: 12 MMOL/L (ref 5–15)
BASOPHILS # BLD AUTO: 0.02 10*3/MM3 (ref 0–0.2)
BASOPHILS NFR BLD AUTO: 0.1 % (ref 0–1.5)
BUN SERPL-MCNC: 26 MG/DL (ref 8–23)
BUN/CREAT SERPL: 50 (ref 7–25)
CALCIUM SPEC-SCNC: 9.3 MG/DL (ref 8.2–9.6)
CHLORIDE SERPL-SCNC: 95 MMOL/L (ref 98–107)
CO2 SERPL-SCNC: 27 MMOL/L (ref 22–29)
CREAT SERPL-MCNC: 0.52 MG/DL (ref 0.57–1)
DEPRECATED RDW RBC AUTO: 42.7 FL (ref 37–54)
EGFRCR SERPLBLD CKD-EPI 2021: 85.1 ML/MIN/1.73
EOSINOPHIL # BLD AUTO: 0.01 10*3/MM3 (ref 0–0.4)
EOSINOPHIL NFR BLD AUTO: 0.1 % (ref 0.3–6.2)
ERYTHROCYTE [DISTWIDTH] IN BLOOD BY AUTOMATED COUNT: 13.1 % (ref 12.3–15.4)
GLUCOSE SERPL-MCNC: 193 MG/DL (ref 65–99)
HCT VFR BLD AUTO: 44.7 % (ref 34–46.6)
HGB BLD-MCNC: 15.2 G/DL (ref 12–15.9)
IMM GRANULOCYTES # BLD AUTO: 0.12 10*3/MM3 (ref 0–0.05)
IMM GRANULOCYTES NFR BLD AUTO: 0.7 % (ref 0–0.5)
LYMPHOCYTES # BLD AUTO: 1.93 10*3/MM3 (ref 0.7–3.1)
LYMPHOCYTES NFR BLD AUTO: 11.1 % (ref 19.6–45.3)
MAGNESIUM SERPL-MCNC: 2.1 MG/DL (ref 1.7–2.3)
MCH RBC QN AUTO: 30.2 PG (ref 26.6–33)
MCHC RBC AUTO-ENTMCNC: 34 G/DL (ref 31.5–35.7)
MCV RBC AUTO: 88.9 FL (ref 79–97)
MONOCYTES # BLD AUTO: 1.25 10*3/MM3 (ref 0.1–0.9)
MONOCYTES NFR BLD AUTO: 7.2 % (ref 5–12)
NEUTROPHILS NFR BLD AUTO: 13.98 10*3/MM3 (ref 1.7–7)
NEUTROPHILS NFR BLD AUTO: 80.8 % (ref 42.7–76)
NRBC BLD AUTO-RTO: 0 /100 WBC (ref 0–0.2)
PLATELET # BLD AUTO: 220 10*3/MM3 (ref 140–450)
PMV BLD AUTO: 10 FL (ref 6–12)
POTASSIUM SERPL-SCNC: 3.9 MMOL/L (ref 3.5–5.2)
RBC # BLD AUTO: 5.03 10*6/MM3 (ref 3.77–5.28)
SODIUM SERPL-SCNC: 134 MMOL/L (ref 136–145)
WBC NRBC COR # BLD AUTO: 17.31 10*3/MM3 (ref 3.4–10.8)

## 2025-04-17 PROCEDURE — 25010000002 ENOXAPARIN PER 10 MG: Performed by: INTERNAL MEDICINE

## 2025-04-17 PROCEDURE — 97530 THERAPEUTIC ACTIVITIES: CPT

## 2025-04-17 PROCEDURE — 97110 THERAPEUTIC EXERCISES: CPT

## 2025-04-17 PROCEDURE — 97116 GAIT TRAINING THERAPY: CPT

## 2025-04-17 PROCEDURE — 80048 BASIC METABOLIC PNL TOTAL CA: CPT | Performed by: NURSE PRACTITIONER

## 2025-04-17 PROCEDURE — 85025 COMPLETE CBC W/AUTO DIFF WBC: CPT | Performed by: INTERNAL MEDICINE

## 2025-04-17 PROCEDURE — 25010000002 CEFAZOLIN PER 500 MG: Performed by: GENERAL PRACTICE

## 2025-04-17 PROCEDURE — 99232 SBSQ HOSP IP/OBS MODERATE 35: CPT | Performed by: NURSE PRACTITIONER

## 2025-04-17 PROCEDURE — 83735 ASSAY OF MAGNESIUM: CPT | Performed by: INTERNAL MEDICINE

## 2025-04-17 RX ORDER — DILTIAZEM HYDROCHLORIDE 5 MG/ML
5 INJECTION INTRAVENOUS ONCE
Status: DISCONTINUED | OUTPATIENT
Start: 2025-04-17 | End: 2025-04-17

## 2025-04-17 RX ORDER — DILTIAZEM HYDROCHLORIDE 30 MG/1
30 TABLET, FILM COATED ORAL EVERY 6 HOURS SCHEDULED
Status: DISCONTINUED | OUTPATIENT
Start: 2025-04-17 | End: 2025-04-18

## 2025-04-17 RX ORDER — CARVEDILOL 25 MG/1
25 TABLET ORAL 2 TIMES DAILY WITH MEALS
Status: DISCONTINUED | OUTPATIENT
Start: 2025-04-17 | End: 2025-04-17

## 2025-04-17 RX ORDER — METOPROLOL TARTRATE 1 MG/ML
5 INJECTION, SOLUTION INTRAVENOUS ONCE
Status: DISCONTINUED | OUTPATIENT
Start: 2025-04-17 | End: 2025-04-19 | Stop reason: HOSPADM

## 2025-04-17 RX ORDER — DILTIAZEM HYDROCHLORIDE 5 MG/ML
5 INJECTION INTRAVENOUS ONCE
Status: COMPLETED | OUTPATIENT
Start: 2025-04-17 | End: 2025-04-17

## 2025-04-17 RX ORDER — CARVEDILOL 25 MG/1
25 TABLET ORAL 2 TIMES DAILY WITH MEALS
Status: DISCONTINUED | OUTPATIENT
Start: 2025-04-17 | End: 2025-04-19 | Stop reason: HOSPADM

## 2025-04-17 RX ADMIN — ACETAMINOPHEN 650 MG: 325 TABLET, FILM COATED ORAL at 21:30

## 2025-04-17 RX ADMIN — CARVEDILOL 25 MG: 25 TABLET, FILM COATED ORAL at 17:53

## 2025-04-17 RX ADMIN — DILTIAZEM HYDROCHLORIDE 5 MG: 5 INJECTION, SOLUTION INTRAVENOUS at 12:05

## 2025-04-17 RX ADMIN — SPIRONOLACTONE 25 MG: 25 TABLET ORAL at 09:15

## 2025-04-17 RX ADMIN — CITALOPRAM HYDROBROMIDE 10 MG: 20 TABLET ORAL at 09:15

## 2025-04-17 RX ADMIN — DILTIAZEM HYDROCHLORIDE 30 MG: 30 TABLET, FILM COATED ORAL at 12:05

## 2025-04-17 RX ADMIN — CEFAZOLIN 2000 MG: 2 INJECTION, POWDER, FOR SOLUTION INTRAMUSCULAR; INTRAVENOUS at 01:31

## 2025-04-17 RX ADMIN — Medication 10 ML: at 21:31

## 2025-04-17 RX ADMIN — CEFAZOLIN 2000 MG: 2 INJECTION, POWDER, FOR SOLUTION INTRAMUSCULAR; INTRAVENOUS at 09:19

## 2025-04-17 RX ADMIN — Medication 10 ML: at 09:20

## 2025-04-17 RX ADMIN — ENOXAPARIN SODIUM 40 MG: 100 INJECTION SUBCUTANEOUS at 16:16

## 2025-04-17 RX ADMIN — DILTIAZEM HYDROCHLORIDE 30 MG: 30 TABLET, FILM COATED ORAL at 17:53

## 2025-04-17 RX ADMIN — CEFAZOLIN 2000 MG: 2 INJECTION, POWDER, FOR SOLUTION INTRAMUSCULAR; INTRAVENOUS at 17:41

## 2025-04-17 RX ADMIN — LOSARTAN POTASSIUM 50 MG: 50 TABLET, FILM COATED ORAL at 09:15

## 2025-04-17 RX ADMIN — CARVEDILOL 25 MG: 25 TABLET, FILM COATED ORAL at 09:33

## 2025-04-17 NOTE — PROGRESS NOTES
[facility] -  Progress Note     LOS: 2 days   Patient Care Team:  Reginaldo Uribe MD as PCP - General (Family Medicine)    Chief Complaint: A-fib follow-up    Subjective     Interval History: Alert awake and sitting up in chair.  She has not yet had a bowel movement and continues to have abdominal distention.  She denies any symptoms associated with her atrial fibrillation.  She is on room air.  She denies shortness of breath.  She has no chest pain or palpitations to report.  She has a wound VAC in place.     Telemetry: Atrial fibrillation with rapid ventricular response heart rates 120-140    Review of Systems:     Review of Systems   Constitutional:  Positive for appetite change. Negative for diaphoresis and fever.   Respiratory:  Negative for apnea, cough, chest tightness, shortness of breath and wheezing.    Cardiovascular:  Negative for chest pain, palpitations and leg swelling.   Gastrointestinal:  Positive for abdominal distention. Negative for vomiting.   Genitourinary:  Negative for decreased urine volume, difficulty urinating and dysuria.   Neurological:  Positive for weakness. Negative for dizziness, light-headedness and headaches.   Psychiatric/Behavioral:  Negative for agitation and confusion. The patient is not nervous/anxious.      Objective     Vital Sign Min/Max for last 24 hours  Temp  Min: 97.5 °F (36.4 °C)  Max: 98 °F (36.7 °C)   BP  Min: 141/96  Max: 179/119   Pulse  Min: 109  Max: 143   Resp  Min: 18  Max: 22   SpO2  Min: 94 %  Max: 95 %   No data recorded   No data recorded         04/15/25  0500   Weight: 80.9 kg (178 lb 5.6 oz)       Physical Exam:    Vitals reviewed.   Constitutional:       General: Awake. Not in acute distress.     Appearance: Well-developed, well-groomed, overweight and not in distress. Chronically ill-appearing.   HENT:      Head: Normocephalic and atraumatic.   Pulmonary:      Effort: Pulmonary effort is normal.      Breath sounds:  Normal breath sounds.   Cardiovascular:      Tachycardia present. Irregularly irregular rhythm.   Edema:     Pretibial: bilateral trace edema of the pretibial area.  Abdominal:      General: Bowel sounds are normal. There is distension.      Tenderness: There is no abdominal tenderness.   Musculoskeletal:      Cervical back: Normal range of motion and neck supple. Skin:     General: Skin is warm and dry.   Neurological:      Mental Status: Alert, oriented to person, place, and time and oriented to person, place and time.   Psychiatric:         Attention and Perception: Attention normal.         Mood and Affect: Mood normal.         Speech: Speech normal.         Behavior: Behavior normal. Behavior is cooperative.         Thought Content: Thought content normal.         Cognition and Memory: Cognition and memory normal.         Judgment: Judgment normal.       Results Review:   Lab Results (last 72 hours)      Lab Results   Component Value Date    WBC 17.31 (H) 04/17/2025    HGB 15.2 04/17/2025    HCT 44.7 04/17/2025    MCV 88.9 04/17/2025     04/17/2025     Lab Results   Component Value Date    GLUCOSE 193 (H) 04/17/2025    CALCIUM 9.3 04/17/2025     (L) 04/17/2025    K 3.9 04/17/2025    CO2 27.0 04/17/2025    CL 95 (L) 04/17/2025    BUN 26 (H) 04/17/2025    CREATININE 0.52 (L) 04/17/2025    EGFR 85.1 04/17/2025    BCR 50.0 (H) 04/17/2025    ANIONGAP 12.0 04/17/2025     Wound Culture   Date Value Ref Range Status   04/15/2025 No growth  Preliminary       Medication Review: yes  Current Facility-Administered Medications   Medication Dose Route Frequency Provider Last Rate Last Admin    acetaminophen (TYLENOL) tablet 650 mg  650 mg Oral Q4H PRN Dav Trevino MD   650 mg at 04/15/25 1823    Or    acetaminophen (TYLENOL) 160 MG/5ML oral solution 650 mg  650 mg Oral Q4H PRN Dav Trevino MD        Or    acetaminophen (TYLENOL) suppository 650 mg  650 mg Rectal Q4H PRN Dav Trevino MD         sennosides-docusate (PERICOLACE) 8.6-50 MG per tablet 2 tablet  2 tablet Oral BID PRN Dav Trevino MD        And    polyethylene glycol (MIRALAX) packet 17 g  17 g Oral Daily PRN Dav Trevino MD   17 g at 04/16/25 1142    And    bisacodyl (DULCOLAX) EC tablet 5 mg  5 mg Oral Daily PRN Dav Trevino MD        And    bisacodyl (DULCOLAX) suppository 10 mg  10 mg Rectal Daily PRN Dav Trevino MD        Calcium Replacement - Follow Nurse / BPA Driven Protocol   Not Applicable PRN Abdelrahman Mohamud MD        carvedilol (COREG) tablet 25 mg  25 mg Oral BID With Meals Abdelrahman Mohamud MD   25 mg at 04/17/25 0933    ceFAZolin 2000 mg IVPB in 100 mL NS (MBP)  2,000 mg Intravenous Q8H Dav Trevino MD   2,000 mg at 04/17/25 0919    citalopram (CeleXA) tablet 10 mg  10 mg Oral Daily Dav Trevino MD   10 mg at 04/17/25 0915    enoxaparin sodium (LOVENOX) syringe 40 mg  40 mg Subcutaneous Q24H Abdelrahman Mohamud MD   40 mg at 04/16/25 1602    losartan (COZAAR) tablet 50 mg  50 mg Oral Q24H Abdelrahman Mohamud MD   50 mg at 04/17/25 0915    Magnesium Low Dose Replacement - Follow Nurse / BPA Driven Protocol   Not Applicable PRN Abdelrahman Mohamud MD        metoprolol tartrate (LOPRESSOR) injection 5 mg  5 mg Intravenous Once Forrest Sanchez MD        nitroglycerin (NITROSTAT) SL tablet 0.4 mg  0.4 mg Sublingual Q5 Min PRN Dav Trevino MD        Phosphorus Replacement - Follow Nurse / BPA Driven Protocol   Not Applicable PRN Abdelrahman Mohamud MD        Potassium Replacement - Follow Nurse / BPA Driven Protocol   Not Applicable PRN Abdelrahman Mohamud MD        sodium chloride 0.9 % flush 10 mL  10 mL Intravenous PRN Dav Trevino MD        sodium chloride 0.9 % flush 10 mL  10 mL Intravenous Q12H Dav Trevino MD   10 mL at 04/17/25 0920    sodium chloride 0.9 % flush 10 mL  10 mL Intravenous PRN Dav Trevino MD        sodium chloride 0.9 % infusion 40 mL  40 mL Intravenous PRN Dav Trevino MD         spironolactone (ALDACTONE) tablet 25 mg  25 mg Oral Daily Claire Jaeger APRN   25 mg at 04/17/25 0915     Results for orders placed during the hospital encounter of 04/14/25    Adult Transthoracic Echo Complete W/ Cont if Necessary Per Protocol    Interpretation Summary    Left ventricular systolic function is normal. Left ventricular ejection fraction appears to be 56 - 60%.    Left ventricular wall thickness is consistent with mild concentric hypertrophy.    Normal right ventricular cavity size noted. Borderline low right ventricular systolic function noted.    No significant (greater than mild) valvular abnormalities identified on this study.        Assessment & Plan     1.  Atrial fibrillation with rapid ventricular response: Asymptomatic with persistent tachycardia. Normal EF.  Coreg has been increased to 25 mg twice daily.  Add diltiazem 30 mg every 6 hours and titrate for rate control and blood pressure control.  Chronicity unknown.  Not anticoagulated due to recent falls, advanced age, and previous history of gastritis.    2.  Hypertension: Elevated.  Home medications have been resumed.  Beta-blocker therapy has been added.  Add diltiazem      3.  Hypokalemia: Resolved.  4.  Chronic hyponatremia: Stable.  5.  Wound: Status post debridement postop day 2, receiving antibiotics.  6.  Fall: Found down at home prior to arrival.  7.  Advanced age    Diltiazem 5 mg IV now  Diltiazem 30mg by mouth every 6 hours - titrate dose further as needed  Continue coreg and aldactone.       Electronically signed by AGUSTIN Bardales, 04/17/25, 9:49 AM CDT.

## 2025-04-17 NOTE — THERAPY TREATMENT NOTE
Acute Care - Physical Therapy Treatment Note  [facility]     Patient Name: BRANDON Melchor  : 1928  MRN: [id number]  Today's Date: 2025      Visit Dx:     ICD-10-CM ICD-9-CM   1. Cellulitis of buttock  L03.317 682.5   2. Atrial fibrillation with rapid ventricular response  I48.91 427.31   3. Hyponatremia  E87.1 276.1   4. Wound of sacral region  S31.000A 959.19   5. Impaired mobility [Z74.09]  Z74.09 799.89     Patient Active Problem List   Diagnosis    Cellulitis     Past Medical History:   Diagnosis Date    A-fib     Diabetes mellitus     Glaucoma     Hyperlipidemia     Hypertension      Past Surgical History:   Procedure Laterality Date    HYSTERECTOMY      KNEE SURGERY Right     TOE SURGERY Right     WOUND DEBRIDEMENT N/A 4/15/2025    Procedure: INCISION AND DEBRIDEMENT SACRAL WOUND;  Surgeon: Dav Trevino MD;  Location: [facility] OR;  Service: General;  Laterality: N/A;     PT Assessment (Last 12 Hours)       PT Evaluation and Treatment       Row Name 25 1033          Physical Therapy Time and Intention    Subjective Information complains of;weakness;fatigue  -NW     Document Type therapy note (daily note);wound care  -NW     Mode of Treatment physical therapy  -NW     Comment trac pad disconnected from woundvac/ new trac pad replaced  -       Row Name 25 1033          General Information    Existing Precautions/Restrictions fall  [place]  -       Row Name 25 1033          Pain    Pretreatment Pain Rating 0/10 - no pain  -NW     Pre/Posttreatment Pain Comment no pain just discomfort  -       Row Name 25 1033          Bed Mobility    Comment, (Bed Mobility) chair  -       Row Name 25 1033          Transfers    Transfers toilet transfer  -       Row Name 25 1033          Sit-Stand Transfer    Sit-Stand [place] (Transfers) verbal cues;minimum assist (75% patient effort)  -       Row Name 25 1033          Stand-Sit Transfer    Stand-Sit  [place] (Transfers) verbal cues;minimum assist (75% patient effort)  -NW       Row Name 04/17/25 1033          Toilet Transfer    [place] Level (Toilet Transfer) verbal cues;minimum assist (75% patient effort)  -NW       Row Name 04/17/25 1033          Gait/Stairs (Locomotion)    [place] Level (Gait) verbal cues;minimum assist (75% patient effort)  -NW     Assistive Device (Gait) walker, front-wheeled  -NW     Distance in Feet (Gait) 20  20x2  -NW     Pattern (Gait) step-to  -NW     Deviations/Abnormal Patterns (Gait) jordan decreased;base of support, narrow  -NW     Bilateral Gait Deviations forward flexed posture  -NW     Comment, (Gait/Stairs) very slow gaurded gait. pt able to amb from chair-to BR and outside in hallway big [place] and amb bk into room to chair  -NW       Row Name 04/17/25 1033          Safety Issues/Impairments Affecting Functional Mobility    Impairments Affecting Function (Mobility) strength;endurance/activity tolerance  -NW     Cognitive Impairments, Mobility Safety/Performance safety precaution awareness  -NW       Row Name 04/17/25 1033          Motor Skills    Therapeutic Exercise aerobic  -NW       Row Name 04/17/25 1033          Aerobic Exercise    Time Performed (Aerobic Exercise) AROM BLEs  -NW       Row Name             Wound 04/15/25 1337 coccyx Surgical    Wound - Properties Group Placement Date: 04/15/25  -EP Placement Time: 1337  -EP Present on Original Admission: N  -EP Location: coccyx  -EP Primary Wound Type: Surgical  -EP    Retired Wound - Properties Group Placement Date: 04/15/25  -EP Placement Time: 1337  -EP Present on Original Admission: N  -EP Location: coccyx  -EP    Retired Wound - Properties Group Placement Date: 04/15/25  -EP Placement Time: 1337  -EP Present on Original Admission: N  -EP Location: coccyx  -EP    Retired Wound - Properties Group Date first assessed: 04/15/25  -EP Time first assessed: 1337  -EP Present on Original Admission: N  -EP  Location: coccyx  -EP      Row Name 04/17/25 1033          NPWT (Negative Pressure Wound Therapy) 04/16/25 1325    NPWT (Negative Pressure Wound Therapy) - Properties Group Placement Date: 04/16/25 -AJ Placement Time: 1325 -AJ    Therapy Setting continuous therapy  -NW     Pressure Setting 125 mmHg  -NW     Retired NPWT (Negative Pressure Wound Therapy) - Properties Group Placement Date: 04/16/25 -AJ Placement Time: 1325 -AJ    Retired NPWT (Negative Pressure Wound Therapy) - Properties Group Placement Date: 04/16/25 -AJ Placement Time: 1325 -AJ    Retired NPWT (Negative Pressure Wound Therapy) - Properties Group Placement Date: 04/16/25 -AJ Placement Time: 1325 -AJ      Row Name 04/17/25 1033          Positioning and Restraints    Pre-Treatment Position sitting in chair/recliner  -NW     Post Treatment Position chair  -NW     In Chair reclined;call light within reach;encouraged to call for assist;notified nsg  -               User Key  (r) = Recorded By, (t) = Taken By, (c) = Cosigned By      Initials Name Provider Type    NW Alba Riddle, PTA Physical Therapist Assistant    Piyush Snow, RN Registered Nurse    Minor Mcdaniel, PT DPT Physical Therapist                    Physical Therapy Education       Title: PT OT SLP Therapies (Done)       Topic: Physical Therapy (Done)       Point: Mobility training (Done)       Learning Progress Summary            Patient Acceptance, E, VU,NR by MAHI at 4/16/2025 1450    Comment: educated on role and benefit of wound vac, and its healing purposes. wet to dry if wound vac comes off or seal is lost    Acceptance, E, VU,NR by MAHI at 4/16/2025 1046    Comment: role of PT, call for assist, goal setting, d/c planning                      Point: Home exercise program (Done)       Learning Progress Summary            Patient Acceptance, E, VU,NR by MAHI at 4/16/2025 1450    Comment: educated on role and benefit of wound vac, and its healing purposes. wet to dry if wound  "vac comes off or seal is lost    Acceptance, E, VU,NR by MAHI at 4/16/2025 1046    Comment: role of PT, call for assist, goal setting, d/c planning                      Point: Body mechanics (Done)       Learning Progress Summary            Patient Acceptance, E, VU,NR by MAHI at 4/16/2025 1450    Comment: educated on role and benefit of wound vac, and its healing purposes. wet to dry if wound vac comes off or seal is lost    Acceptance, E, VU,NR by MAHI at 4/16/2025 1046    Comment: role of PT, call for assist, goal setting, d/c planning                      Point: Precautions (Done)       Learning Progress Summary            Patient Acceptance, E, VU,NR by MAHI at 4/16/2025 1450    Comment: educated on role and benefit of wound vac, and its healing purposes. wet to dry if wound vac comes off or seal is lost    Acceptance, E, VU,NR by MAHI at 4/16/2025 1046    Comment: role of PT, call for assist, goal setting, d/c planning                                      User Key       Initials Effective Dates Name Provider Type Discipline     08/15/24 -  Minor Goss, PT DPT Physical Therapist PT                  PT Recommendation and Plan     Progress: improving  Outcome Evaluation: Pt up in chair w/ no c/o. \"feeling better\". [place] checked and replaced trac pad, will plan to change dressing tomorrow. sit-stand cga/min x1. Pt able to amb 15' to BR help clean up then amb another 20' rwx cga slow gaurded gait cues for safety. Pt amb bk to chair tolerated AROM BLEs. left reclined. pt will need snf upon d/c to cont working on strength, mobility and safety along w/ Woundcare.       Time Calculation:    PT Charges       Row Name 04/17/25 1111             Time Calculation    Start Time 1033  -NW      Stop Time 1111  -NW      Time Calculation (min) 38 min  -NW      PT Received On 04/17/25  -NW      PT Goal Re-Cert Due Date 04/26/25  -NW         Time Calculation- PT    Total Timed Code Minutes- PT 38 minute(s)  -NW         Timed Charges    " 67067 - PT Therapeutic Exercise Minutes 15  -NW      77737 - Gait Training Minutes  15  -NW      67987 - PT Therapeutic Activity Minutes 8  -NW         Total Minutes    Timed Charges Total Minutes 38  -NW       Total Minutes 38  -NW                User Key  (r) = Recorded By, (t) = Taken By, (c) = Cosigned By      Initials Name Provider Type    NW Alba Riddle PTA Physical Therapist Assistant                  Therapy Charges for Today       Code Description Service Date Service Provider Modifiers Qty    04151956759 HC PT THER PROC EA 15 MIN 4/17/2025 Alba Riddle, DOMENICA GP 1    06042587574 HC GAIT TRAINING EA 15 MIN 4/17/2025 Alba Riddle, DOMENICA GP 1    07054840710 HC PT THERAPEUTIC ACT EA 15 MIN 4/17/2025 Alba Riddle, DOMENICA GP 1            PT G-Codes  Outcome Measure Options: AM-PAC 6 Clicks Basic Mobility (PT)  AM-PAC 6 Clicks Score (PT): 15  AM-PAC 6 Clicks Score (OT): 13    Alba Riddle PTA  4/17/2025

## 2025-04-17 NOTE — CASE MANAGEMENT/SOCIAL WORK
1. We will notify you of lab results.  2. Increase sertraline to 100 mg daily.  3. Eliminate alcohol use for the time being.  4. Check blood pressure at home.  5. You should get a call to schedule for Hol[facility] or you can call them.     Continued Stay Note   Julian     Patient Name: BRANDON Melchor  MRN: [id number]  Today's Date: 4/17/2025    Admit Date: 4/14/2025    Plan: SNF   Discharge Plan       Row Name 04/17/25 0850       Plan    Plan SNF    Patient/Family in Agreement with Plan yes    Plan Comments [facility] has offered a bed on pt.  Awaiting response from [facility].    Final Discharge Disposition Code 03 - skilled nursing facility (SNF)                   Discharge Codes    No documentation.                 Expected Discharge Date and Time       Expected Discharge Date Expected Discharge Time    Apr 18, 2025               GEORGE Paredes

## 2025-04-17 NOTE — PLAN OF CARE
"Goal Outcome Evaluation:  Plan of Care Reviewed With: patient        Progress: improving  Outcome Evaluation: Pt up in chair w/ no c/o. \"feeling better\". [place] checked and replaced trac pad, will plan to change dressing tomorrow. sit-stand cga/min x1. Pt able to amb 15' to BR help clean up then amb another 20' rwx cga slow gaurded gait cues for safety. Pt amb bk to chair tolerated AROM BLEs. left reclined. pt will need snf upon d/c to cont working on strength, mobility and safety along w/ Woundcare.                             "

## 2025-04-17 NOTE — PLAN OF CARE
Goal Outcome Evaluation:  Plan of Care Reviewed With: patient     Patient rested end of shift after getting up and sitting in chair, beginning of shift patient very restless, said she didn't want to be by herself, In afib on monitor 112-154, did call dr to get medicine ordered for HR, seems to have periodic confusion , worse in the evening, wound vac on sacral wound in place through the night, bp elevated, on room air , safety precautions maintained,

## 2025-04-18 LAB — GLUCOSE BLDC GLUCOMTR-MCNC: 213 MG/DL (ref 70–130)

## 2025-04-18 PROCEDURE — 97605 NEG PRS WND THER DME<=50SQCM: CPT

## 2025-04-18 PROCEDURE — 97116 GAIT TRAINING THERAPY: CPT

## 2025-04-18 PROCEDURE — 99232 SBSQ HOSP IP/OBS MODERATE 35: CPT | Performed by: NURSE PRACTITIONER

## 2025-04-18 PROCEDURE — 25010000002 ENOXAPARIN PER 10 MG: Performed by: INTERNAL MEDICINE

## 2025-04-18 PROCEDURE — 82948 REAGENT STRIP/BLOOD GLUCOSE: CPT

## 2025-04-18 PROCEDURE — 97535 SELF CARE MNGMENT TRAINING: CPT

## 2025-04-18 PROCEDURE — 25010000002 CEFAZOLIN PER 500 MG: Performed by: GENERAL PRACTICE

## 2025-04-18 PROCEDURE — 25010000002 CEFAZOLIN PER 500 MG: Performed by: INTERNAL MEDICINE

## 2025-04-18 RX ORDER — DILTIAZEM HYDROCHLORIDE 30 MG/1
60 TABLET, FILM COATED ORAL EVERY 6 HOURS SCHEDULED
Status: DISCONTINUED | OUTPATIENT
Start: 2025-04-18 | End: 2025-04-19 | Stop reason: HOSPADM

## 2025-04-18 RX ADMIN — CARVEDILOL 25 MG: 25 TABLET, FILM COATED ORAL at 08:56

## 2025-04-18 RX ADMIN — DILTIAZEM HYDROCHLORIDE 30 MG: 30 TABLET, FILM COATED ORAL at 01:10

## 2025-04-18 RX ADMIN — LOSARTAN POTASSIUM 50 MG: 50 TABLET, FILM COATED ORAL at 08:57

## 2025-04-18 RX ADMIN — DILTIAZEM HYDROCHLORIDE 30 MG: 30 TABLET, FILM COATED ORAL at 06:08

## 2025-04-18 RX ADMIN — CITALOPRAM HYDROBROMIDE 10 MG: 20 TABLET ORAL at 08:57

## 2025-04-18 RX ADMIN — CARVEDILOL 25 MG: 25 TABLET, FILM COATED ORAL at 17:36

## 2025-04-18 RX ADMIN — DILTIAZEM HYDROCHLORIDE 60 MG: 30 TABLET, FILM COATED ORAL at 23:42

## 2025-04-18 RX ADMIN — SPIRONOLACTONE 25 MG: 25 TABLET ORAL at 08:57

## 2025-04-18 RX ADMIN — ENOXAPARIN SODIUM 40 MG: 100 INJECTION SUBCUTANEOUS at 14:57

## 2025-04-18 RX ADMIN — CEFAZOLIN 2000 MG: 2 INJECTION, POWDER, FOR SOLUTION INTRAMUSCULAR; INTRAVENOUS at 09:01

## 2025-04-18 RX ADMIN — CEFAZOLIN 2000 MG: 2 INJECTION, POWDER, FOR SOLUTION INTRAMUSCULAR; INTRAVENOUS at 17:38

## 2025-04-18 RX ADMIN — Medication 10 ML: at 20:49

## 2025-04-18 RX ADMIN — ACETAMINOPHEN 650 MG: 325 TABLET, FILM COATED ORAL at 20:49

## 2025-04-18 RX ADMIN — DILTIAZEM HYDROCHLORIDE 60 MG: 30 TABLET, FILM COATED ORAL at 11:50

## 2025-04-18 RX ADMIN — CEFAZOLIN 2000 MG: 2 INJECTION, POWDER, FOR SOLUTION INTRAMUSCULAR; INTRAVENOUS at 01:10

## 2025-04-18 RX ADMIN — Medication 10 ML: at 09:06

## 2025-04-18 RX ADMIN — DILTIAZEM HYDROCHLORIDE 60 MG: 30 TABLET, FILM COATED ORAL at 17:36

## 2025-04-18 NOTE — THERAPY TREATMENT NOTE
"Acute Care - Physical Therapy Treatment Note  [facility]     Patient Name: BRANDON Melchor  : 1928  MRN: [id number]  Today's Date: 2025      Visit Dx:     ICD-10-CM ICD-9-CM   1. Cellulitis of buttock  L03.317 682.5   2. Atrial fibrillation with rapid ventricular response  I48.91 427.31   3. Hyponatremia  E87.1 276.1   4. Wound of sacral region  S31.000A 959.19   5. Impaired mobility [Z74.09]  Z74.09 799.89     Patient Active Problem List   Diagnosis    Cellulitis     Past Medical History:   Diagnosis Date    A-fib     Diabetes mellitus     Glaucoma     Hyperlipidemia     Hypertension      Past Surgical History:   Procedure Laterality Date    HYSTERECTOMY      KNEE SURGERY Right     TOE SURGERY Right     WOUND DEBRIDEMENT N/A 4/15/2025    Procedure: INCISION AND DEBRIDEMENT SACRAL WOUND;  Surgeon: Dav Trevino MD;  Location: [facility];  Service: General;  Laterality: N/A;     PT Assessment (Last 12 Hours)       PT Evaluation and Treatment       Row Name 25 1108 25 0937       Physical Therapy Time and Intention    Subjective Information complains of;pain;weakness  -NW --  -NW    Document Type therapy note (daily note);wound care  -NW --  -NW    Mode of Treatment physical therapy  -NW --  -NW    Session Not Performed -- other (see comments)  -NW    Comment, Session Not Performed -- waiting to get cleaned up will ck bk  -NW    Comment WV disrupted through the night and nsg placed wet-dry placed new dressing  -NW --      Row Name 25 1108          General Information    Existing Precautions/Restrictions fall  WV  -NW       Row Name 25 1108          Pain    Pretreatment Pain Rating 4/10  -NW     Posttreatment Pain Rating 7/10  -NW     Pain Location buttock  and c/o L knee and hip \"im sure its from my fall\"  -NW       Row Name 25 1108          Bed Mobility    Bed Mobility sit-supine  -NW     Supine-Sit [place] (Bed Mobility) --  up chair  -NW     Sit-Supine " [place] (Bed Mobility) verbal cues;minimum assist (75% patient effort)  -NW       Row Name 04/18/25 1108          Sit-Stand Transfer    Sit-Stand [place] (Transfers) verbal cues;minimum assist (75% patient effort)  mult sit-stands before pt able to amb  -NW       Row Name 04/18/25 1108          Stand-Sit Transfer    Stand-Sit [place] (Transfers) verbal cues;contact guard;minimum assist (75% patient effort)  -NW       Row Name 04/18/25 1108          Gait/Stairs (Locomotion)    [place] Level (Gait) contact guard;minimum assist (75% patient effort)  -NW     Assistive Device (Gait) walker, front-wheeled  -NW     Distance in Feet (Gait) 30  30x2  -NW     Deviations/Abnormal Patterns (Gait) jordan decreased;stride length decreased  -NW     Bilateral Gait Deviations forward flexed posture  -NW     Comment, (Gait/Stairs) pt up in chair. having difficulty w/ standing due to increased pain in buttock from wound and L leg/knee/hip. pt amb w/ fwd flex posture and needing cues to stay inside rwx.  -NW       Row Name 04/18/25 1108          Safety Issues/Impairments Affecting Functional Mobility    Impairments Affecting Function (Mobility) balance;pain;strength;endurance/activity tolerance  -NW       Row Name 04/18/25 1108          Wound 04/15/25 1337 coccyx Surgical    Wound - Properties Group Placement Date: 04/15/25  -EP Placement Time: 1337  -EP Present on Original Admission: N  -EP Location: coccyx  -EP Primary Wound Type: Surgical  -EP    Base clean;granulating;moist;red;pink  -NW     Drainage Amount scant  -NW     Care, Wound negative pressure wound therapy  -NW     Wound Output (mL) --  scant in canister  -NW     Retired Wound - Properties Group Placement Date: 04/15/25  -EP Placement Time: 1337  -EP Present on Original Admission: N  -EP Location: coccyx  -EP    Retired Wound - Properties Group Placement Date: 04/15/25  -EP Placement Time: 1337  -EP Present on Original Admission: N  -EP Location:  coccyx  -EP    Retired Wound - Properties Group Date first assessed: 04/15/25  -EP Time first assessed: 1337  -EP Present on Original Admission: N  -EP Location: coccyx  -EP      Row Name 04/18/25 1108          NPWT (Negative Pressure Wound Therapy) 04/16/25 1325    NPWT (Negative Pressure Wound Therapy) - Properties Group Placement Date: 04/16/25  -AJ Placement Time: 1325  -AJ    Therapy Setting continuous therapy  -NW     Dressing foam, black  -NW     Pressure Setting 125 mmHg  -NW     Sponges Inserted 2  1 for woundbed and 1 for trac pad  -NW     Retired NPWT (Negative Pressure Wound Therapy) - Properties Group Placement Date: 04/16/25  -AJ Placement Time: 1325  -AJ    Retired NPWT (Negative Pressure Wound Therapy) - Properties Group Placement Date: 04/16/25  -AJ Placement Time: 1325  -AJ    Retired NPWT (Negative Pressure Wound Therapy) - Properties Group Placement Date: 04/16/25  - Placement Time: 1325  -AJ      Row Name 04/18/25 1108          Positioning and Restraints    Pre-Treatment Position sitting in chair/recliner  -NW     Post Treatment Position bed  -NW     In Bed side lying left;call light within reach;encouraged to call for assist;exit alarm on;notified ns  -               User Key  (r) = Recorded By, (t) = Taken By, (c) = Cosigned By      Initials Name Provider Type    Alba Frank, PTA Physical Therapist Assistant    Piyush Snow, RN Registered Nurse    Minor Mcdaniel, PT DPT Physical Therapist                    Physical Therapy Education       Title: PT OT SLP Therapies (Done)       Topic: Physical Therapy (Done)       Point: Mobility training (Done)       Learning Progress Summary            Patient Acceptance, E, VU,NR by MAHI at 4/16/2025 1450    Comment: educated on role and benefit of wound vac, and its healing purposes. wet to dry if wound vac comes off or seal is lost    Acceptance, E, VU,NR by MAHI at 4/16/2025 1046    Comment: role of PT, call for assist, goal setting, d/c  "planning                      Point: Home exercise program (Done)       Learning Progress Summary            Patient Acceptance, E, VU,NR by  at 4/16/2025 1450    Comment: educated on role and benefit of wound vac, and its healing purposes. wet to dry if wound vac comes off or seal is lost    Acceptance, E, VU,NR by  at 4/16/2025 1046    Comment: role of PT, call for assist, goal setting, d/c planning                      Point: Body mechanics (Done)       Learning Progress Summary            Patient Acceptance, E, VU,NR by  at 4/16/2025 1450    Comment: educated on role and benefit of wound vac, and its healing purposes. wet to dry if wound vac comes off or seal is lost    Acceptance, E, VU,NR by  at 4/16/2025 1046    Comment: role of PT, call for assist, goal setting, d/c planning                      Point: Precautions (Done)       Learning Progress Summary            Patient Acceptance, E, VU,NR by  at 4/16/2025 1450    Comment: educated on role and benefit of wound vac, and its healing purposes. wet to dry if wound vac comes off or seal is lost    Acceptance, E, VU,NR by  at 4/16/2025 1046    Comment: role of PT, call for assist, goal setting, d/c planning                                      User Key       Initials Effective Dates Name Provider Type Discipline     08/15/24 -  Minor Goss, PT DPT Physical Therapist PT                  PT Recommendation and Plan     Progress: improving  Outcome Evaluation: Pt up in chair w/ no c/o. \"feeling better\". WV checked and replaced trac pad, will plan to change dressing tomorrow. sit-stand cga/min x1. Pt able to amb 15' to BR help clean up then amb another 20' rwx cga slow gaurded gait cues for safety. Pt amb bk to chair tolerated AROM BLEs. left reclined. pt will need snf upon d/c to cont working on strength, mobility and safety along w/ Woundcare.       Time Calculation:     Therapy Charges for Today       Code Description Service Date Service Provider " Modifiers Qty    51373663637 HC PT THER PROC EA 15 MIN 4/17/2025 Alba Riddle, PTA GP 1    58101393305 HC GAIT TRAINING EA 15 MIN 4/17/2025 Alba Riddle, PTA GP 1    98029517518 HC PT THERAPEUTIC ACT EA 15 MIN 4/17/2025 Alba Riddle, PTA GP 1            PT G-Codes  Outcome Measure Options: AM-PAC 6 Clicks Basic Mobility (PT)  AM-PAC 6 Clicks Score (PT): 15  AM-PAC 6 Clicks Score (OT): 16    Alba Riddle PTA  4/18/2025

## 2025-04-18 NOTE — PLAN OF CARE
Goal Outcome Evaluation:  Plan of Care Reviewed With: patient        Progress: improving  Outcome Evaluation: Pt requests bathing task but declined shower task. Pt Antwon bed mobility, pt needs increased encouragement to complete as I as possible. Pt EOB performed bathing with wipes s/u UB, LB modA. Pt sit<>stand CGA-Antwon but pt with incontinent episode of urine. Required seated RB and LB hygiene 2nd time ModA. Pt applied brief modA, pt with increased pain reports in wound with all LB activity and sitting in chair. Pt dons UB s/u gown. Pt in chair with waffle cushion, educated on weight shifting every 15 mins and importance of position change for wound/skin management. Oral and facial care s/u. Pt would benefit from continued OT POC and SNF

## 2025-04-18 NOTE — PLAN OF CARE
Goal Outcome Evaluation:  Plan of Care Reviewed With: patient        Progress: improving  Outcome Evaluation: Pt is A&O with slight confusion and on RA. VSS. AF  on tele. Plan on care ongoing. Safety maintained.

## 2025-04-18 NOTE — PLAN OF CARE
"Goal Outcome Evaluation:  Plan of Care Reviewed With: patient        Progress: improving  Outcome Evaluation: Pt up in chair. mult sit-stands min x1 before able to attempt amb due to increased pain in buttock from wound and c/o pain in L leg/hip/knee.\"im sure its from my fall\". Pt able to amb short distances 30' fwd flex posture, slow gaurded gait cues for safetey. help pt btb min x1. with pt in Left side lying placed WV. WV had been disrupted over night and wet-dry placed. Wound clean, moist, red and granulating. w only scant drainage in canister. will cont to change M_W_F as long as seal is kept and don't cont to have problems. pt will need snf cont to work on strength, mobilty and safety.                             "

## 2025-04-18 NOTE — PROGRESS NOTES
"[facility] -  Progress Note     LOS: 3 days   Patient Care Team:  Reginaldo Uribe MD as PCP - General (Family Medicine)    Chief Complaint: follow up afib, rvr    Subjective     Interval History:     Patient Complaints: The patient is currently resting comfortably in bed.  Her only complaint at this time is being cold.  She denies any chest pain, shortness of breath or palpitations.  Review of telemetry reveals she remains in atrial fibrillation with heart rates in the 70s to low 100s.  She is hemodynamically stable with systolic blood pressures in the 120s to 150s.        Review of Systems:     Review of Systems   Constitutional:  Negative for diaphoresis, fatigue, fever and unexpected weight change.        \"Cold\"   HENT:  Negative for nosebleeds.    Respiratory:  Negative for apnea, cough, chest tightness, shortness of breath and wheezing.    Cardiovascular:  Negative for chest pain, palpitations and leg swelling.   Gastrointestinal:  Negative for abdominal distention, nausea and vomiting.   Genitourinary:  Negative for hematuria.   Musculoskeletal:  Negative for gait problem.   Skin:  Negative for color change.   Neurological:  Negative for dizziness, syncope, weakness and light-headedness.     Objective     Vital Sign Min/Max for last 24 hours  Temp  Min: 97.8 °F (36.6 °C)  Max: 98.2 °F (36.8 °C)   BP  Min: 125/64  Max: 153/85   Pulse  Min: 78  Max: 143   Resp  Min: 18  Max: 20   SpO2  Min: 93 %  Max: 94 %   No data recorded   Weight  Min: 51.7 kg (114 lb)  Max: 51.7 kg (114 lb)         04/18/25  0651   Weight: 51.7 kg (114 lb)       Physical Exam:    Vitals and nursing note reviewed.   Constitutional:       General: Not in acute distress.     Appearance: Well-developed and not in distress. Not diaphoretic.   Neck:      Vascular: No JVD.   Pulmonary:      Effort: Pulmonary effort is normal. No respiratory distress.      Breath sounds: Normal breath sounds.   Cardiovascular:      " Normal rate. Irregularly irregular rhythm.      Murmurs: There is no murmur.   Edema:     Peripheral edema absent.   Abdominal:      Tenderness: There is no abdominal tenderness.   Skin:     General: Skin is warm and dry.   Neurological:      Mental Status: Alert and oriented to person, place, and time.       Results Review:   Lab Results (last 72 hours)       Procedure Component Value Units Date/Time    Anaerobic Culture - Surgical Site, Sacrum [[id number]]  (Normal) Collected: 04/15/25 1329    Specimen: Surgical Site from Sacrum Updated: 04/18/25 0645     Anaerobic Culture Screening for Anaerobes    Blood Culture - Blood, Arm, Right [[id number]]  (Normal) Collected: 04/14/25 2340    Specimen: Blood from Arm, Right Updated: 04/18/25 0000     Blood Culture No growth at 3 days    Blood Culture - Blood, Arm, Left [[id number]]  (Normal) Collected: 04/14/25 2335    Specimen: Blood from Arm, Left Updated: 04/17/25 2345     Blood Culture No growth at 3 days    Wound Culture - Surgical Site, Sacrum [[id number]] Collected: 04/15/25 1329    Specimen: Surgical Site from Sacrum Updated: 04/17/25 0910     Wound Culture Growth present, too young to evaluate     Gram Stain Few (2+) Gram positive cocci      Rare (1+) Gram positive bacilli      No WBCs seen    Basic Metabolic Panel [[id number]]  (Abnormal) Collected: 04/17/25 0426    Specimen: Blood Updated: 04/17/25 0522     Glucose 193 mg/dL      BUN 26 mg/dL      Creatinine 0.52 mg/dL      Sodium 134 mmol/L      Potassium 3.9 mmol/L      Chloride 95 mmol/L      CO2 27.0 mmol/L      Calcium 9.3 mg/dL      BUN/Creatinine Ratio 50.0     Anion Gap 12.0 mmol/L      eGFR 85.1 mL/min/1.73     Narrative:      GFR Categories in Chronic Kidney Disease (CKD)      GFR Category          GFR (mL/min/1.73)    Interpretation  G1                     90 or greater         Normal or high (1)  G2                      60-89                Mild decrease (1)  G3a                   45-59                Mild  to moderate decrease  G3b                   30-44                Moderate to severe decrease  G4                    15-29                Severe decrease  G5                    14 or less           Kidney failure          (1)In the absence of evidence of kidney disease, neither GFR category G1 or G2 fulfill the criteria for CKD.    eGFR calculation 2021 CKD-EPI creatinine equation, which does not include race as a factor    Magnesium [[id number]]  (Normal) Collected: 04/17/25 0426    Specimen: Blood Updated: 04/17/25 0522     Magnesium 2.1 mg/dL     CBC & Differential [[id number]]  (Abnormal) Collected: 04/17/25 0426    Specimen: Blood Updated: 04/17/25 0446    Narrative:      The following orders were created for panel order CBC & Differential.  Procedure                               Abnormality         Status                     ---------                               -----------         ------                     CBC Auto Differential[[id number]]        Abnormal            Final result                 Please view results for these tests on the individual orders.    CBC Auto Differential [[id number]]  (Abnormal) Collected: 04/17/25 0426    Specimen: Blood Updated: 04/17/25 0446     WBC 17.31 10*3/mm3      RBC 5.03 10*6/mm3      Hemoglobin 15.2 g/dL      Hematocrit 44.7 %      MCV 88.9 fL      MCH 30.2 pg      MCHC 34.0 g/dL      RDW 13.1 %      RDW-SD 42.7 fl      MPV 10.0 fL      Platelets 220 10*3/mm3      Neutrophil % 80.8 %      Lymphocyte % 11.1 %      Monocyte % 7.2 %      Eosinophil % 0.1 %      Basophil % 0.1 %      Immature Grans % 0.7 %      Neutrophils, Absolute 13.98 10*3/mm3      Lymphocytes, Absolute 1.93 10*3/mm3      Monocytes, Absolute 1.25 10*3/mm3      Eosinophils, Absolute 0.01 10*3/mm3      Basophils, Absolute 0.02 10*3/mm3      Immature Grans, Absolute 0.12 10*3/mm3      nRBC 0.0 /100 WBC     Potassium [[id number]]  (Normal) Collected: 04/16/25 2148    Specimen: Blood Updated: 04/16/25 2217      Potassium 3.9 mmol/L     Basic Metabolic Panel [[id number]]  (Abnormal) Collected: 04/16/25 0355    Specimen: Blood Updated: 04/16/25 0458     Glucose 219 mg/dL      BUN 17 mg/dL      Creatinine 0.57 mg/dL      Sodium 134 mmol/L      Potassium 3.0 mmol/L      Chloride 93 mmol/L      CO2 27.0 mmol/L      Calcium 9.3 mg/dL      BUN/Creatinine Ratio 29.8     Anion Gap 14.0 mmol/L      eGFR 83.3 mL/min/1.73     Narrative:      GFR Categories in Chronic Kidney Disease (CKD)      GFR Category          GFR (mL/min/1.73)    Interpretation  G1                     90 or greater         Normal or high (1)  G2                      60-89                Mild decrease (1)  G3a                   45-59                Mild to moderate decrease  G3b                   30-44                Moderate to severe decrease  G4                    15-29                Severe decrease  G5                    14 or less           Kidney failure          (1)In the absence of evidence of kidney disease, neither GFR category G1 or G2 fulfill the criteria for CKD.    eGFR calculation 2021 CKD-EPI creatinine equation, which does not include race as a factor    Magnesium [[id number]]  (Normal) Collected: 04/16/25 0355    Specimen: Blood Updated: 04/16/25 0458     Magnesium 1.8 mg/dL     CBC & Differential [[id number]]  (Abnormal) Collected: 04/16/25 0355    Specimen: Blood Updated: 04/16/25 0454    Narrative:      The following orders were created for panel order CBC & Differential.  Procedure                               Abnormality         Status                     ---------                               -----------         ------                     CBC Auto Differential[[id number]]        Abnormal            Final result                 Please view results for these tests on the individual orders.    CBC Auto Differential [[id number]]  (Abnormal) Collected: 04/16/25 0355    Specimen: Blood Updated: 04/16/25 0454     WBC 16.14 10*3/mm3      RBC 5.06  "10*6/mm3      Hemoglobin 14.9 g/dL      Hematocrit 45.4 %      MCV 89.7 fL      MCH 29.4 pg      MCHC 32.8 g/dL      RDW 13.1 %      RDW-SD 42.8 fl      MPV 10.5 fL      Platelets 188 10*3/mm3      Neutrophil % 83.0 %      Lymphocyte % 8.0 %      Monocyte % 8.0 %      Eosinophil % 0.0 %      Basophil % 0.2 %      Immature Grans % 0.8 %      Neutrophils, Absolute 13.39 10*3/mm3      Lymphocytes, Absolute 1.29 10*3/mm3      Monocytes, Absolute 1.29 10*3/mm3      Eosinophils, Absolute 0.00 10*3/mm3      Basophils, Absolute 0.04 10*3/mm3      Immature Grans, Absolute 0.13 10*3/mm3      nRBC 0.0 /100 WBC     Fungus Culture - Surgical Site, Sacrum [[id number]] Collected: 04/15/25 1329    Specimen: Surgical Site from Sacrum Updated: 04/15/25 1437    POC Glucose Once [[id number]]  (Abnormal) Collected: 04/15/25 1349    Specimen: Blood Updated: 04/15/25 1401     Glucose 179 mg/dL      Comment: : [id number] Fariba Brunsoner ID: [id number]       Magnesium [[id number]]  (Normal) Collected: 04/15/25 0416    Specimen: Blood Updated: 04/15/25 1003     Magnesium 1.8 mg/dL                 Echo EF Estimated  No results found for: \"ECHOEFEST\"      Cath Ejection Fraction Quantitative  No results found for: \"CATHEF\"        Medication Review: yes  Current Facility-Administered Medications   Medication Dose Route Frequency Provider Last Rate Last Admin    acetaminophen (TYLENOL) tablet 650 mg  650 mg Oral Q4H PRN Dav Trevino MD   650 mg at 04/17/25 2130    Or    acetaminophen (TYLENOL) 160 MG/5ML oral solution 650 mg  650 mg Oral Q4H PRN Dav Trevino MD        Or    acetaminophen (TYLENOL) suppository 650 mg  650 mg Rectal Q4H PRN Dav Trevino MD        sennosides-docusate (PERICOLACE) 8.6-50 MG per tablet 2 tablet  2 tablet Oral BID PRN Dav Trevino MD        And    polyethylene glycol (MIRALAX) packet 17 g  17 g Oral Daily PRN Dav Trevino MD   17 g at 04/16/25 1142    And    bisacodyl (DULCOLAX) EC tablet 5 mg  5 mg Oral " Daily PRN Dav Trevino MD        And    bisacodyl (DULCOLAX) suppository 10 mg  10 mg Rectal Daily PRN Dav Trevino MD        Calcium Replacement - Follow Nurse / BPA Driven Protocol   Not Applicable PRN Abdelrahman Mohamud MD        carvedilol (COREG) tablet 25 mg  25 mg Oral BID With Meals Abdelrahman Mohamud MD   25 mg at 04/17/25 1753    ceFAZolin 2000 mg IVPB in 100 mL NS (MBP)  2,000 mg Intravenous Q8H Dav Trevino MD   2,000 mg at 04/18/25 0110    citalopram (CeleXA) tablet 10 mg  10 mg Oral Daily Dav Trevino MD   10 mg at 04/17/25 0915    dilTIAZem (CARDIZEM) tablet 30 mg  30 mg Oral Q6H Claire Jaeger APRN   30 mg at 04/18/25 0608    enoxaparin sodium (LOVENOX) syringe 40 mg  40 mg Subcutaneous Q24H Abdelrahman Mohamud MD   40 mg at 04/17/25 1616    losartan (COZAAR) tablet 50 mg  50 mg Oral Q24H Abdelrahman Mohamud MD   50 mg at 04/17/25 0915    Magnesium Low Dose Replacement - Follow Nurse / BPA Driven Protocol   Not Applicable PRAbdelrahman Garay MD        metoprolol tartrate (LOPRESSOR) injection 5 mg  5 mg Intravenous Once Forrest Sanchez MD        nitroglycerin (NITROSTAT) SL tablet 0.4 mg  0.4 mg Sublingual Q5 Min PRN Dav Trevino MD        Phosphorus Replacement - Follow Nurse / BPA Driven Protocol   Not Applicable PRN Abdelrahman Mohamud MD        Potassium Replacement - Follow Nurse / BPA Driven Protocol   Not Applicable PRN Abdelrahman Mohamud MD        sodium chloride 0.9 % flush 10 mL  10 mL Intravenous PRN Dav Trevino MD        sodium chloride 0.9 % flush 10 mL  10 mL Intravenous Q12H Dav Trevino MD   10 mL at 04/17/25 2131    sodium chloride 0.9 % flush 10 mL  10 mL Intravenous PRN Dav Trevino MD        sodium chloride 0.9 % infusion 40 mL  40 mL Intravenous PRN Dav Trevino MD        spironolactone (ALDACTONE) tablet 25 mg  25 mg Oral Daily Claire Jaeger APRN   25 mg at 04/17/25 2692         Assessment & Plan     1.  Atrial fibrillation, with rapid ventricular  response: Today her heart rate is well-controlled after adding diltiazem yesterday.  Overall, she is without complaint.    - Continue Coreg 25 mg twice daily  -At discharge transition short acting diltiazem 30 mg every 6 hours to Cardizem  mg daily  - No anticoagulation given falls, advanced age, history of gastritis    2.  Hypertension: Stable.  Continue current medical therapy-she is on carvedilol, diltiazem, Aldactone, losartan  3.  Wound: Status postdebridement postop day 3  4.  Fall: Found down at home  5.  Advanced age    Cardiology will sign off, recall as needed.  It would be reasonable to continue to follow with her primary care physician for rate control of her atrial fibrillation or if she prefers to also follow with our group she will need a 6-week follow-up with Dr. Faye or AGUSTIN Thomas APRN  04/18/25  07:48 CDT

## 2025-04-18 NOTE — THERAPY TREATMENT NOTE
Patient Name: BRANDON Melchor  : 1928    MRN: [id number]                              Today's Date: 2025       Admit Date: 2025    Visit Dx:     ICD-10-CM ICD-9-CM   1. Cellulitis of buttock  L03.317 682.5   2. Atrial fibrillation with rapid ventricular response  I48.91 427.31   3. Hyponatremia  E87.1 276.1   4. Wound of sacral region  S31.000A 959.19   5. Impaired mobility [Z74.09]  Z74.09 799.89     Patient Active Problem List   Diagnosis    Cellulitis     Past Medical History:   Diagnosis Date    A-fib     Diabetes mellitus     Glaucoma     Hyperlipidemia     Hypertension      Past Surgical History:   Procedure Laterality Date    HYSTERECTOMY      KNEE SURGERY Right     TOE SURGERY Right     WOUND DEBRIDEMENT N/A 4/15/2025    Procedure: INCISION AND DEBRIDEMENT SACRAL WOUND;  Surgeon: Dav Trevino MD;  Location: [facility] OR;  Service: General;  Laterality: N/A;      General Information       Row Name 25 0942          OT Time and Intention    Document Type therapy note (daily note)  -MT     Mode of Treatment occupational therapy  -MT     Patient Effort good  -MT       Row Name 25 0942          General Information    Patient Profile Reviewed yes  -MT     Existing Precautions/Restrictions fall  w/v not in place time of OT tx  -MT       Row Name 25 0942          Safety Issues/Impairments Affecting Functional Mobility    Impairments Affecting Function (Mobility) strength;endurance/activity tolerance  -MT     Cognitive Impairments, Mobility Safety/Performance safety precaution follow-through;safety precaution awareness  -MT               User Key  (r) = Recorded By, (t) = Taken By, (c) = Cosigned By      Initials Name Provider Type    MT Valerie Pastor COTA Occupational Therapist Assistant                     Mobility/ADL's       Row Name 25 0942          Bed Mobility    Supine-Sit [place] (Bed Mobility) minimum assist (75% patient effort);verbal cues;nonverbal cues  (demo/gesture)  -MT     Assistive Device (Bed Mobility) bed rails;head of bed elevated  -MT     Comment, (Bed Mobility) pt needs increased encouragement to complete as I as possible  -MT       Row Name 04/18/25 0942          Transfers    Transfers sit-stand transfer;stand-sit transfer  -MT       Row Name 04/18/25 09          Sit-Stand Transfer    Sit-Stand [place] (Transfers) verbal cues;minimum assist (75% patient effort)  -MT       Row Name 04/18/25 09          Stand-Sit Transfer    Stand-Sit [place] (Transfers) verbal cues;minimum assist (75% patient effort)  -MT               User Key  (r) = Recorded By, (t) = Taken By, (c) = Cosigned By      Initials Name Provider Type    Valerie Valle COTA Occupational Therapist Assistant                   Obj/Interventions    No documentation.                  Goals/Plan    No documentation.                  Clinical Impression       Row Name 04/18/25 0942          Pain Assessment    Pretreatment Pain Rating 0/10 - no pain  -MT     Posttreatment Pain Rating 8/10  wound sitting or LB activity  -MT       Row Name 04/18/25 0942          Plan of Care Review    Plan of Care Reviewed With patient  -MT       Row Name 04/18/25 0942          Therapy Plan Review/Discharge Plan (OT)    Anticipated Discharge Disposition (OT) skilled nursing facility  -MT       Row Name 04/18/25 0942          Positioning and Restraints    Pre-Treatment Position in bed  -MT     Post Treatment Position chair  -MT     In Chair sitting;call light within reach;encouraged to call for assist;exit alarm on  -MT               User Key  (r) = Recorded By, (t) = Taken By, (c) = Cosigned By      Initials Name Provider Type    Valerie Valle COTA Occupational Therapist Assistant                   Outcome Measures       Row Name 04/18/25 0942          How much help from another is currently needed...    Putting on and taking off regular lower body clothing? 2  -MT     Bathing (including  washing, rinsing, and drying) 2  -MT     Toileting (which includes using toilet bed pan or urinal) 2  -MT     Putting on and taking off regular upper body clothing 3  -MT     Taking care of personal grooming (such as brushing teeth) 3  -MT     Eating meals 4  -MT     AM-PAC 6 Clicks Score (OT) 16  -MT       Row Name 04/18/25 0900          How much help from another person do you currently need...    Turning from your back to your side while in flat bed without using bedrails? 3  -RB     Moving from lying on back to sitting on the side of a flat bed without bedrails? 3  -RB     Moving to and from a bed to a chair (including a wheelchair)? 3  -RB     Standing up from a chair using your arms (e.g., wheelchair, bedside chair)? 3  -RB     Climbing 3-5 steps with a railing? 1  -RB     To walk in hospital room? 2  -RB     AM-PAC 6 Clicks Score (PT) 15  -RB     Highest Level of Mobility Goal 4 --> Transfer to chair/commode  -RB               User Key  (r) = Recorded By, (t) = Taken By, (c) = Cosigned By      Initials Name Provider Type    MT Valerie Pastor COTA Occupational Therapist Assistant    Cortes Beckman, RN Registered Nurse                    Occupational Therapy Education       Title: PT OT SLP Therapies (Done)       Topic: Occupational Therapy (Done)       Point: ADL training (Done)       Learning Progress Summary            Patient Acceptance, E, VU by MT at 4/18/2025 0942    Comment: purpose of OT    Acceptance, E,D, VU,DU,NR by  at 4/16/2025 0950                      Point: Precautions (Done)       Learning Progress Summary            Patient Acceptance, E,D, VU,DU,NR by  at 4/16/2025 0950                      Point: Body mechanics (Done)       Learning Progress Summary            Patient Acceptance, E,D, VU,DU,NR by  at 4/16/2025 0950                                      User Key       Initials Effective Dates Name Provider Type [facility] 06/16/21 -  Valerie Pastor COTA Occupational Therapist  Assistant OT     10/08/24 -  Rozina Hernández OTR/L Occupational Therapist OT                  OT Recommendation and Plan     Plan of Care Review  Plan of Care Reviewed With: patient  Progress: improving  Outcome Evaluation: Pt requests bathing task but declined shower task. Pt [place] bed mobility, pt needs increased encouragement to complete as I as possible. Pt EOB performed bathing with wipes s/u UB, LB modA. Pt sit<>stand CGA-Antwon but pt with incontinent episode of urine. Required seated RB and LB hygiene 2nd time ModA. Pt applied brief modA, pt with increased pain reports in wound with all LB activity and sitting in chair. Pt dons UB s/u gown. Pt in chair with waffle cushion, educated on weight shifting every 15 mins and importance of position change for wound/skin management. Oral and facial care s/u. Pt would benefit from continued OT POC and SNF     Time Calculation:         Time Calculation- OT       Row Name 04/18/25 0942             Time Calculation- OT    OT Start Time 0942  -MT      OT Stop Time 1055  -MT      OT Time Calculation (min) 73 min  -MT      Total Timed Code Minutes- OT 73 minute(s)  -MT      OT Received On 04/18/25  -MT         Timed Charges    51058 - OT Self Care/Mgmt Minutes 73  -MT         Total Minutes    Timed Charges Total Minutes 73  -MT       Total Minutes 73  -MT                User Key  (r) = Recorded By, (t) = Taken By, (c) = Cosigned By      Initials Name Provider Type    MT Valerie Pastor COTA Occupational Therapist Assistant                  Therapy Charges for Today       Code Description Service Date Service Provider Modifiers Qty    [id number]  OT SELF CARE/MGMT/TRAIN EA 15 MIN 4/18/2025 Valerie Pastor COTA GO 5                 RENETTA Mason  4/18/2025

## 2025-04-18 NOTE — PROGRESS NOTES
[facility]  INPATIENT PROGRESS NOTE    Patient Name: BRANDON Melchor  Date of Admission: 4/14/2025  Today's Date: 04/18/25  Length of Stay: 3  Primary Care Physician: Reginaldo Uribe MD    Subjective   Chief Complaint: Generalized weakness    Patient has no new complaint this morning, she is looking a lot better this morning and states she feels much better.  Discussed discharge planning as soon as we able to control her heart rate, increased Cardizem from 30 mg 4 times daily to 60 mg 4 times daily    Review of Systems   All pertinent negatives and positives are as above. All other systems have been reviewed and are negative unless otherwise stated.     Objective    Temp:  [97.5 °F (36.4 °C)-98.2 °F (36.8 °C)] 97.5 °F (36.4 °C)  Heart Rate:  [] 81  Resp:  [18] 18  BP: (125-154)/(62-85) 134/66  Physical Exam  GEN: Awake, alert,  uncomfortable and in respiratory distress  HEENT: Atraumatic, PERRLA, EOMI, Anicteric, Trachea midline  Lungs: Bilateral crackles with wheeze  Heart: RRR, +S1/s2, no rub  ABD: soft, nt/nd, +BS, no guarding/rebound  Extremities: atraumatic, no cyanosis, no edema  Groin: Patient has a right gluteal necrotic wound with induration and abscess drainage.  Skin: no rashes or lesions  Neuro: AAOx3, no focal deficits  Psych: normal mood & affect       Results Review:  I have reviewed the labs, radiology results, and diagnostic studies.    Laboratory Data:   Results from last 7 days   Lab Units 04/17/25  0426 04/16/25  0355 04/15/25  0407   WBC 10*3/mm3 17.31* 16.14* 19.16*   HEMOGLOBIN g/dL 15.2 14.9 12.8   HEMATOCRIT % 44.7 45.4 40.1   PLATELETS 10*3/mm3 220 188 163        Results from last 7 days   Lab Units 04/17/25  0426 04/16/25  2148 04/16/25  0355 04/15/25  0416 04/14/25  2129   SODIUM mmol/L 134*  --  134* 134* 129*   POTASSIUM mmol/L 3.9 3.9 3.0* 3.6 4.6   CHLORIDE mmol/L 95*  --  93* 98 93*   CO2 mmol/L 27.0  --   "27.0 23.0 23.0   BUN mg/dL 26*  --  17 13 15   CREATININE mg/dL 0.52*  --  0.57 0.42* 0.49*   CALCIUM mg/dL 9.3  --  9.3 8.7 9.2   BILIRUBIN mg/dL  --   --   --   --  0.7   ALK PHOS U/L  --   --   --   --  88   ALT (SGPT) U/L  --   --   --   --  18   AST (SGOT) U/L  --   --   --   --  25   GLUCOSE mg/dL 193*  --  219* 156* 178*       Culture Data:   No results found for: \"BLOODCX\", \"URINECX\", \"WOUNDCX\", \"MRSACX\", \"RESPCX\", \"STOOLCX\"    Radiology Data:   Imaging Results (Last 24 Hours)       ** No results found for the last 24 hours. **            I have reviewed the patient's current medications.     Assessment/Plan   Assessment  Active Hospital Problems    Diagnosis     **Cellulitis        Treatment Plan    -Acute hypoxic respiratory failure secondary to diastolic CHF exacerbation  Patient has improved significantly this morning, needing about 2 L of oxygen.  Continue oxygen support and wean down as tolerated.    -Probable diastolic congestive heart failure   Patient's proBNP was elevated on admission, echocardiogram was ordered and showed preserved ejection fraction of 56 to 60%.  Cardiology on consult and has adjusted patient's medication-switched metoprolol to Coreg, and started patient on Aldactone and discontinued furosemide.  Patient losartan was also continued.    -Right gluteal abscess/cellulitis  General Surgery was consulted and patient is status post surgery which was reported as follows-  Procedure(s):  Excisional debridement of sacral wound to level of fascia.  She was started on Zosyn from the emergency room, switched antibiotics to cefazolin.  We will follow surgical cultures [negative growth so far] and wound care consulted for continued wound management.    - A-fib with RVR on presentation  Cardiology has switched rate control to Coreg, patient not on anticoagulation because of recent falls.  We will continue rate control and follow cardiologist recommendations.  Coreg increased to 25 mg twice " daily and Cardizem increased from 30 mg to 60 mg every 6 hours to achieve rate control.    Other chronic medical conditions-  Diabetes mellitus  Hypertension  Hyperlipidemia    DVT prophylaxis-will start Lovenox      Disposition- discharge planning for tomorrow if rate control is achieved [increased Cardizem 30 mg 4 times daily to 60 mg 4 times daily today]      Electronically signed by Abdelrahman Mohamud MD, 04/18/25, 14:45 CDT.

## 2025-04-18 NOTE — CASE MANAGEMENT/SOCIAL WORK
Continued Stay Note   [place]     Patient Name: BRANDON Melchor  MRN: [id number]  Today's Date: 4/18/2025    Admit Date: 4/14/2025    Plan: [facility]   Discharge Plan       Row Name 04/18/25 1135       Plan    Plan [facility]    Patient/Family in Agreement with Plan yes    Plan Comments [facility] has offered a bed on pt.  SW informed Shruthi who accepted.  Pt has had 3 night inpt stay and can dc once medically stable.  Phone:  [phone] Fax: [phone].  Pharmacy has been changed in [facility].                   Discharge Codes    No documentation.                 Expected Discharge Date and Time       Expected Discharge Date Expected Discharge Time    Apr 18, 2025               ZAYDA ParedesW

## 2025-04-18 NOTE — CASE MANAGEMENT/SOCIAL WORK
Continued Stay Note   [place]     Patient Name: BRANDON Melchor  MRN: [id number]  Today's Date: 4/18/2025    Admit Date: 4/14/2025    Plan: SNF   Discharge Plan       Row Name 04/18/25 0833       Plan    Plan SNF    Patient/Family in Agreement with Plan yes    Plan Comments [facility] has offered a bed on pt.   has left a voicemail for Shruthi at [phone] to inform.                   Discharge Codes    No documentation.                 Expected Discharge Date and Time       Expected Discharge Date Expected Discharge Time    Apr 18, 2025               GEORGE Paredes

## 2025-04-19 VITALS
RESPIRATION RATE: 18 BRPM | DIASTOLIC BLOOD PRESSURE: 83 MMHG | WEIGHT: 114 LBS | HEIGHT: 66 IN | BODY MASS INDEX: 18.32 KG/M2 | HEART RATE: 79 BPM | SYSTOLIC BLOOD PRESSURE: 129 MMHG | TEMPERATURE: 98.2 F | OXYGEN SATURATION: 92 %

## 2025-04-19 PROBLEM — S31.000A WOUND OF SACRAL REGION: Status: ACTIVE | Noted: 2025-04-19

## 2025-04-19 PROBLEM — I10 ESSENTIAL HYPERTENSION, BENIGN: Chronic | Status: ACTIVE | Noted: 2025-04-19

## 2025-04-19 PROBLEM — I48.91 ATRIAL FIBRILLATION: Chronic | Status: ACTIVE | Noted: 2025-04-19

## 2025-04-19 PROBLEM — M46.28 ABSCESS, SACRUM: Status: ACTIVE | Noted: 2025-04-19

## 2025-04-19 LAB
ANION GAP SERPL CALCULATED.3IONS-SCNC: 9 MMOL/L (ref 5–15)
BACTERIA SPEC AEROBE CULT: NORMAL
BASOPHILS # BLD AUTO: 0.04 10*3/MM3 (ref 0–0.2)
BASOPHILS NFR BLD AUTO: 0.5 % (ref 0–1.5)
BUN SERPL-MCNC: 22 MG/DL (ref 8–23)
BUN/CREAT SERPL: 51.2 (ref 7–25)
CALCIUM SPEC-SCNC: 9.1 MG/DL (ref 8.2–9.6)
CHLORIDE SERPL-SCNC: 102 MMOL/L (ref 98–107)
CO2 SERPL-SCNC: 27 MMOL/L (ref 22–29)
CREAT SERPL-MCNC: 0.43 MG/DL (ref 0.57–1)
DEPRECATED RDW RBC AUTO: 44.8 FL (ref 37–54)
EGFRCR SERPLBLD CKD-EPI 2021: 89.1 ML/MIN/1.73
EOSINOPHIL # BLD AUTO: 0.08 10*3/MM3 (ref 0–0.4)
EOSINOPHIL NFR BLD AUTO: 1 % (ref 0.3–6.2)
ERYTHROCYTE [DISTWIDTH] IN BLOOD BY AUTOMATED COUNT: 13 % (ref 12.3–15.4)
GLUCOSE SERPL-MCNC: 175 MG/DL (ref 65–99)
HCT VFR BLD AUTO: 41.8 % (ref 34–46.6)
HGB BLD-MCNC: 13.4 G/DL (ref 12–15.9)
IMM GRANULOCYTES # BLD AUTO: 0.04 10*3/MM3 (ref 0–0.05)
IMM GRANULOCYTES NFR BLD AUTO: 0.5 % (ref 0–0.5)
LYMPHOCYTES # BLD AUTO: 2.44 10*3/MM3 (ref 0.7–3.1)
LYMPHOCYTES NFR BLD AUTO: 29.9 % (ref 19.6–45.3)
MAGNESIUM SERPL-MCNC: 2 MG/DL (ref 1.7–2.3)
MCH RBC QN AUTO: 30 PG (ref 26.6–33)
MCHC RBC AUTO-ENTMCNC: 32.1 G/DL (ref 31.5–35.7)
MCV RBC AUTO: 93.7 FL (ref 79–97)
MONOCYTES # BLD AUTO: 1.06 10*3/MM3 (ref 0.1–0.9)
MONOCYTES NFR BLD AUTO: 13 % (ref 5–12)
NEUTROPHILS NFR BLD AUTO: 4.49 10*3/MM3 (ref 1.7–7)
NEUTROPHILS NFR BLD AUTO: 55.1 % (ref 42.7–76)
NRBC BLD AUTO-RTO: 0 /100 WBC (ref 0–0.2)
PLATELET # BLD AUTO: 211 10*3/MM3 (ref 140–450)
PMV BLD AUTO: 10.3 FL (ref 6–12)
POTASSIUM SERPL-SCNC: 3.9 MMOL/L (ref 3.5–5.2)
RBC # BLD AUTO: 4.46 10*6/MM3 (ref 3.77–5.28)
SODIUM SERPL-SCNC: 138 MMOL/L (ref 136–145)
WBC NRBC COR # BLD AUTO: 8.15 10*3/MM3 (ref 3.4–10.8)

## 2025-04-19 PROCEDURE — 85025 COMPLETE CBC W/AUTO DIFF WBC: CPT | Performed by: INTERNAL MEDICINE

## 2025-04-19 PROCEDURE — 80048 BASIC METABOLIC PNL TOTAL CA: CPT | Performed by: INTERNAL MEDICINE

## 2025-04-19 PROCEDURE — 83735 ASSAY OF MAGNESIUM: CPT | Performed by: INTERNAL MEDICINE

## 2025-04-19 PROCEDURE — 97530 THERAPEUTIC ACTIVITIES: CPT

## 2025-04-19 PROCEDURE — 25010000002 CEFAZOLIN PER 500 MG: Performed by: INTERNAL MEDICINE

## 2025-04-19 PROCEDURE — 25010000002 ONDANSETRON PER 1 MG

## 2025-04-19 RX ORDER — ACETAMINOPHEN 325 MG/1
650 TABLET ORAL EVERY 4 HOURS PRN
Start: 2025-04-19

## 2025-04-19 RX ORDER — ONDANSETRON 2 MG/ML
4 INJECTION INTRAMUSCULAR; INTRAVENOUS ONCE AS NEEDED
Status: COMPLETED | OUTPATIENT
Start: 2025-04-19 | End: 2025-04-19

## 2025-04-19 RX ORDER — BISACODYL 10 MG
10 SUPPOSITORY, RECTAL RECTAL DAILY PRN
Start: 2025-04-19

## 2025-04-19 RX ORDER — SPIRONOLACTONE 25 MG/1
25 TABLET ORAL DAILY
Qty: 30 TABLET | Refills: 0 | Status: SHIPPED | OUTPATIENT
Start: 2025-04-20 | End: 2025-05-20

## 2025-04-19 RX ORDER — POLYETHYLENE GLYCOL 3350 17 G/17G
17 POWDER, FOR SOLUTION ORAL DAILY PRN
Start: 2025-04-19

## 2025-04-19 RX ORDER — CARVEDILOL 25 MG/1
25 TABLET ORAL 2 TIMES DAILY WITH MEALS
Qty: 60 TABLET | Refills: 0 | Status: SHIPPED | OUTPATIENT
Start: 2025-04-19 | End: 2025-05-19

## 2025-04-19 RX ORDER — TRAMADOL HYDROCHLORIDE 50 MG/1
25 TABLET ORAL EVERY 8 HOURS PRN
Qty: 5 TABLET | Refills: 0 | Status: SHIPPED | OUTPATIENT
Start: 2025-04-19 | End: 2025-04-22

## 2025-04-19 RX ORDER — BISACODYL 5 MG/1
5 TABLET, DELAYED RELEASE ORAL DAILY PRN
Start: 2025-04-19

## 2025-04-19 RX ORDER — DILTIAZEM HYDROCHLORIDE 240 MG/1
240 CAPSULE, COATED, EXTENDED RELEASE ORAL DAILY
Qty: 30 CAPSULE | Refills: 0 | Status: SHIPPED | OUTPATIENT
Start: 2025-04-19 | End: 2025-05-19

## 2025-04-19 RX ORDER — AMOXICILLIN 250 MG
2 CAPSULE ORAL 2 TIMES DAILY PRN
Start: 2025-04-19

## 2025-04-19 RX ADMIN — CITALOPRAM HYDROBROMIDE 10 MG: 20 TABLET ORAL at 09:04

## 2025-04-19 RX ADMIN — SPIRONOLACTONE 25 MG: 25 TABLET ORAL at 09:04

## 2025-04-19 RX ADMIN — CEFAZOLIN 2000 MG: 2 INJECTION, POWDER, FOR SOLUTION INTRAMUSCULAR; INTRAVENOUS at 01:08

## 2025-04-19 RX ADMIN — LOSARTAN POTASSIUM 50 MG: 50 TABLET, FILM COATED ORAL at 09:04

## 2025-04-19 RX ADMIN — ONDANSETRON 4 MG: 2 INJECTION INTRAMUSCULAR; INTRAVENOUS at 06:37

## 2025-04-19 RX ADMIN — DILTIAZEM HYDROCHLORIDE 60 MG: 30 TABLET, FILM COATED ORAL at 06:38

## 2025-04-19 RX ADMIN — CEFAZOLIN 2000 MG: 2 INJECTION, POWDER, FOR SOLUTION INTRAMUSCULAR; INTRAVENOUS at 09:06

## 2025-04-19 RX ADMIN — CARVEDILOL 25 MG: 25 TABLET, FILM COATED ORAL at 09:04

## 2025-04-19 RX ADMIN — Medication 10 ML: at 09:12

## 2025-04-19 RX ADMIN — ACETAMINOPHEN 650 MG: 325 TABLET, FILM COATED ORAL at 06:38

## 2025-04-19 RX ADMIN — DILTIAZEM HYDROCHLORIDE 60 MG: 30 TABLET, FILM COATED ORAL at 11:25

## 2025-04-19 NOTE — PLAN OF CARE
Goal Outcome Evaluation:  Plan of Care Reviewed With: patient        Progress: no change  Outcome Evaluation: Pt A&OX4. PRN Tylenol given for back and neck pain.VSS. AF 64-77 per tele. Safety maintained.

## 2025-04-19 NOTE — DISCHARGE SUMMARY
[facility] Medicine Services  DISCHARGE SUMMARY       Date of Admission: 4/14/2025  Date of Discharge:  4/19/2025  Primary Care Physician: Reginaldo Uribe MD    Presenting Problem/History of Present Illness:  89-year-old female with history of atrial fibrillation, diabetes mellitus type 2, hypertension, hyperlipidemia, was brought to the emergency department, admitted for 1425 for generalized weakness, found down in her bathroom that prior afternoon.  She was complaining of severe pain in the right gluteal region.    Final Discharge Diagnoses:  Active Hospital Problems    Diagnosis     **Abscess, sacrum     Wound of sacral region     Atrial fibrillation     Essential hypertension, benign     Cellulitis        Consults: General surgery    Procedures Performed:   Excisional debridement of sacral wound to level of fascia 4/15/25    Pertinent Test Results:   Results for orders placed during the hospital encounter of 04/14/25    Adult Transthoracic Echo Complete W/ Cont if Necessary Per Protocol    Interpretation Summary    Left ventricular systolic function is normal. Left ventricular ejection fraction appears to be 56 - 60%.    Left ventricular wall thickness is consistent with mild concentric hypertrophy.    Normal right ventricular cavity size noted. Borderline low right ventricular systolic function noted.    No significant (greater than mild) valvular abnormalities identified on this study.      Imaging Results (All)       Procedure Component Value Units Date/Time    CT Cervical Spine Without Contrast [[id number]] Collected: 04/15/25 0710     Updated: 04/15/25 0718    Narrative:      EXAMINATION:  CT CERVICAL SPINE WO CONTRAST-  4/14/2025 10:15 PM     HISTORY: Fall with unknown loss consciousness. Rule out fracture.     COMPARISON: No comparison.      DOSE LENGTH PRODUCT: 894.33 mGy.cm Automated exposure control was also  utilized to decrease patient radiation dose.      TECHNIQUE: Serial helical tomographic images of the cervical spine were  obtained without the use of intravenous contrast. Sagittal and coronal  reformatted images were also provided.     FINDINGS:     ALIGNMENT AND ADDITIONAL FINDINGS: There is 2 to 3 mm of anterior  subluxation of C4 compared to C5, degenerative. The alignment is  otherwise normal.     BONES: There is no evidence of fracture. Vertebral body heights are  maintained.     DISC SPACES:     C2-3: There is mild disc narrowing. There is a central disc osteophyte  complex producing minimal dural sac compression. There is facet  arthropathy right greater than left. There is no significant spinal or  foraminal narrowing.     C3-4: There is moderate disc narrowing. There is spondylitic and  uncinate spurring. There is severe uncinate spurring on the right. There  is severe facet arthropathy on the right and mild to moderate facet  arthropathy on the left. There is severe right-sided foraminal  narrowing. There is mild foraminal narrowing on the left.     C4-5: The disc is fairly well maintained in height. There is spondylitic  and uncinate spurring. There is moderate to severe facet arthropathy.  There is severe right and moderate to severe left-sided foraminal  narrowing.     C5-6: There is minimal disc narrowing. There is mild uncinate spurring.  There is mild to moderate facet arthropathy. There is mild to moderate  foraminal narrowing on the right.     C6-7: There is severe disc narrowing. There is spondylitic and uncinate  spurring. There is mild facet arthropathy left greater than right. There  is severe bilateral foraminal narrowing.     C7-T1: The disc is maintained in height. There is mild facet arthropathy  bilaterally. There is no significant spinal or foraminal narrowing.     T1-2: There is severe disc narrowing. There is mild to moderate facet  arthropathy. There is spondylitic and uncinate spurring. There is  moderate to severe foraminal  narrowing right greater than left.          Impression:      1. No evidence of cervical spine fracture.  2. Degenerative changes, as described.           This report was signed and finalized on 4/15/2025 7:15 AM by Dr. Rosendo Natarajan MD.       CT Head Without Contrast [[id number]] Collected: 04/15/25 0656     Updated: 04/15/25 0703    Narrative:      EXAM/TECHNIQUE: CT head without contrast     INDICATION: Fall with unknown loss consciousness     COMPARISON: None available.     DLP: 894.33 mGy.cm. Automated exposure control was utilized to decrease  patient radiation dose.     FINDINGS:     No evidence of intracranial hemorrhage. Gray-white differentiation is  maintained. Global cerebral volume loss and presumed chronic  microvascular ischemic white matter change. No midline shift or mass  effect. Lateral ventricles are nondilated. Basilar cisterns are patent.  No acute orbital finding. Mastoid air cells are clear. Visualized  paranasal sinuses are clear. No acute osseous finding.       Impression:         1. No acute intracranial findings.  2. Global cerebral volume loss and presumed chronic microvascular  ischemic white matter change.        These findings are in agreement with the critical and emergent findings  from the [facility] preliminary report.     This report was signed and finalized on 4/15/2025 7:00 AM by Dr. Mick Owens MD.       XR Chest 1 View [[id number]] Collected: 04/14/25 2138     Updated: 04/14/25 2143    Narrative:      EXAMINATION: XR CHEST 1 VW-  4/14/2025 9:39 PM     HISTORY: Shortness of breath. Edema.     FINDINGS: Upright frontal projection of the chest demonstrates elevation  of the right diaphragm with right basilar atelectasis. Lungs are  otherwise clear. No evidence of cardiac enlargement or vascular  redistribution to suggest congestive change. There is no effusion or  free air present.       Impression:      1.. Elevated right diaphragm with right basilar atelectasis. Lungs  are  otherwise clear.     This report was signed and finalized on 4/14/2025 9:40 PM by Dr. Stevenson Spears MD.             LAB RESULTS:      Lab 04/19/25  0409 04/17/25  0426 04/16/25  0355 04/15/25  0407 04/14/25  2129   WBC 8.15 17.31* 16.14* 19.16* 19.87*   HEMOGLOBIN 13.4 15.2 14.9 12.8 13.8   HEMATOCRIT 41.8 44.7 45.4 40.1 42.7   PLATELETS 211 220 188 163 181   NEUTROS ABS 4.49 13.98* 13.39*  --  16.13*   IMMATURE GRANS (ABS) 0.04 0.12* 0.13*  --  0.15*   LYMPHS ABS 2.44 1.93 1.29  --  2.29   MONOS ABS 1.06* 1.25* 1.29*  --  1.23*   EOS ABS 0.08 0.01 0.00  --  0.01   MCV 93.7 88.9 89.7 93.3 91.4   SED RATE  --   --   --  27  --    CRP  --   --   --   --  14.37*   LACTATE  --   --   --   --  1.7   PROTIME  --   --   --   --  16.3*         Lab 04/19/25  0409 04/17/25  0426 04/16/25  2148 04/16/25  0355 04/15/25  0416 04/14/25  2129   SODIUM 138 134*  --  134* 134* 129*   POTASSIUM 3.9 3.9 3.9 3.0* 3.6 4.6   CHLORIDE 102 95*  --  93* 98 93*   CO2 27.0 27.0  --  27.0 23.0 23.0   ANION GAP 9.0 12.0  --  14.0 13.0 13.0   BUN 22 26*  --  17 13 15   CREATININE 0.43* 0.52*  --  0.57 0.42* 0.49*   EGFR 89.1 85.1  --  83.3 89.6 86.4   GLUCOSE 175* 193*  --  219* 156* 178*   CALCIUM 9.1 9.3  --  9.3 8.7 9.2   MAGNESIUM 2.0 2.1  --  1.8 1.8  --    TSH  --   --   --   --  0.826  --          Lab 04/14/25 2129   TOTAL PROTEIN 7.4   ALBUMIN 3.8   GLOBULIN 3.6   ALT (SGPT) 18   AST (SGOT) 25   BILIRUBIN 0.7   ALK PHOS 88         Lab 04/14/25 2129   PROBNP 3,486.0*   PROTIME 16.3*   INR 1.25*                 Brief Urine Lab Results  (Last result in the past 365 days)        Color   Clarity   Blood   Leuk Est   Nitrite   Protein   CREAT   Urine HCG        04/14/25 2142 Yellow   Clear   Small (1+)   Negative   Negative   100 mg/dL (2+)                 Microbiology Results (last 10 days)       Procedure Component Value - Date/Time    Anaerobic Culture - Surgical Site, Sacrum [[id number]]  (Normal) Collected: 04/15/25 1329    Lab  Status: Preliminary result Specimen: Surgical Site from Sacrum Updated: 04/18/25 0645     Anaerobic Culture Screening for Anaerobes    Wound Culture - Surgical Site, Sacrum [[id number]]  (Abnormal) Collected: 04/15/25 1329    Lab Status: Preliminary result Specimen: Surgical Site from Sacrum Updated: 04/19/25 0855     Wound Culture Scant growth (1+) Gram Positive Cocci     Gram Stain Few (2+) Gram positive cocci      Rare (1+) Gram positive bacilli      No WBCs seen    Narrative:      Organism under investigation.      MRSA Screen, PCR (Inpatient) - Swab, Nares [[id number]]  (Normal) Collected: 04/15/25 0214    Lab Status: Final result Specimen: Swab from Nares Updated: 04/15/25 0334     MRSA PCR No MRSA Detected    Narrative:      The negative predictive value of this diagnostic test is high and should only be used to consider de-escalating anti-MRSA therapy. A positive result may indicate colonization with MRSA and must be correlated clinically.    Blood Culture - Blood, Arm, Right [[id number]]  (Normal) Collected: 04/14/25 2340    Lab Status: Preliminary result Specimen: Blood from Arm, Right Updated: 04/19/25 0000     Blood Culture No growth at 4 days    Blood Culture - Blood, Arm, Left [[id number]]  (Normal) Collected: 04/14/25 2335    Lab Status: Preliminary result Specimen: Blood from Arm, Left Updated: 04/18/25 2345     Blood Culture No growth at 4 days            Hospital Course: The patient was admitted to the hospital for medical management.  She was diagnosed with a right gluteal/sacral ulcer with associated cellulitis and abscess.  She was also diagnosed with atrial fibrillation and rapid ventricular response.  Initially started antibiotic treatment with Zosyn.  Antibiotic management was then transitioned to cefazolin IV.  The patient was evaluated by general surgery and on 4/15/2025 she underwent excisional debridement of sacral wound to level of fascia.  As per surgical specialist, patient had packing of  "the sacral wound, which was then removed on 4/16/2025.  Recommendations were to continue wet-to-dry packing, and no more than 4 days of antibiotic therapy and given adequate control of the source of infection during surgery.  Wound ostomy consult in place.  Wound VAC device application was recommended, to be changed every Monday, Wednesday, and Friday.  Regarding atrial fibrillation, the patient was started on rate control.  She was evaluated by cardiology.  With recommendations to continue Coreg 25 mg p.o. twice a day as well as transition Cardizem, from 60 mg p.o. every 6 hours, to extended release.  No anticoagulation was recommended given advanced age, frequent falls and history of gastritis.  Regarding hypertension management, she will continue carvedilol, Cardizem, Aldactone and losartan.  Clonidine was discontinued.  Patient had adequate progress.  I started taking care of this patient on 4/19/2025.  On my evaluation I was informed that [facility] had offered a bed, and patient had 3 night inpatient stay, and could be discharged to this setting.  Recommend to continue wound care as per surgical specialist and wound care team recommendations.  New prescriptions were sent to patient's pharmacy.      Physical Exam on Discharge:  /85 (BP Location: Right arm, Patient Position: Lying)   Pulse 88   Temp 98.3 °F (36.8 °C) (Oral)   Resp 18   Ht 167.6 cm (66\")   Wt 51.7 kg (114 lb)   SpO2 93%   BMI 18.40 kg/m²   Physical Exam  Constitutional:       Appearance: Alert, oriented to place and person, partially oriented to situation.  No respiratory distress.  HENT:      Head: Normocephalic and atraumatic.      Nose: Nose normal.      Mouth/Throat:      Mouth: Mucous membranes are moist.   Eyes:      Extraocular Movements: Extraocular movements intact.      Conjunctiva/sclera: Conjunctivae normal.      Pupils: Pupils are equal, round  Cardiovascular:      Rate and Rhythm: Normal rate and regular rhythm. "      Pulses: Normal pulses.   Pulmonary:      Effort: No respiratory distress.      Breath sounds: Normal breath sounds.   Abdominal:      General: Abdomen is flat. Bowel sounds are normal.      Palpations: Abdomen is soft.      Tenderness: There is no guarding or rebound.   Extremities:  No lower extremity edema.        Condition on Discharge: Stable    Discharge Disposition:  Skilled Nursing Facility (DC - External)    Discharge Medications:     Discharge Medications        New Medications        Instructions Start Date   acetaminophen 325 MG tablet  Commonly known as: TYLENOL   650 mg, Oral, Every 4 Hours PRN      bisacodyl 5 MG EC tablet  Commonly known as: DULCOLAX   5 mg, Oral, Daily PRN      bisacodyl 10 MG suppository  Commonly known as: DULCOLAX   10 mg, Rectal, Daily PRN      carvedilol 25 MG tablet  Commonly known as: COREG   25 mg, Oral, 2 Times Daily With Meals      dilTIAZem  MG 24 hr capsule  Commonly known as: Cartia XT   240 mg, Oral, Daily      naloxone 4 MG/0.1ML nasal spray  Commonly known as: NARCAN   Call 911. Don't prime. Allen in 1 nostril for overdose. Repeat in 2-3 minutes in other nostril if no or minimal breathing/responsiveness.      polyethylene glycol 17 g packet  Commonly known as: MIRALAX   17 g, Oral, Daily PRN      sennosides-docusate 8.6-50 MG per tablet  Commonly known as: PERICOLACE   2 tablets, Oral, 2 Times Daily PRN      spironolactone 25 MG tablet  Commonly known as: ALDACTONE   25 mg, Oral, Daily   Start Date: April 20, 2025            Changes to Medications        Instructions Start Date   traMADol 50 MG tablet  Commonly known as: ULTRAM  What changed:   how much to take  when to take this   25 mg, Oral, Every 8 Hours PRN             Continue These Medications        Instructions Start Date   citalopram 10 MG tablet  Commonly known as: CeleXA   10 mg, Oral, Daily      dorzolamide-timolol 2-0.5 % ophthalmic solution  Commonly known as: COSOPT   1 drop, Both Eyes, 2  Times Daily      losartan 50 MG tablet  Commonly known as: COZAAR   50 mg, Oral, Daily      torsemide 10 MG tablet  Commonly known as: DEMADEX   5 mg, Oral, Daily             Stop These Medications      cloNIDine 0.2 MG tablet  Commonly known as: CATAPRES              Discharge Diet:   Diet Instructions       Diet: Cardiac Diets; Healthy Heart (2-3 Na+); Regular (IDDSI 7); Thin (IDDSI 0)      Discharge Diet: Cardiac Diets    Cardiac Diet: Healthy Heart (2-3 Na+)    Texture: Regular (IDDSI 7)    Fluid Consistency: Thin (IDDSI 0)            Activity at Discharge: as tolerated    Follow-up Appointments:   No future appointments.    Test Results Pending at Discharge: Final wound cultures    Electronically signed by Rigoberto Balbuena MD, 04/19/25, 09:59 CDT.    Time: 50 minutes.

## 2025-04-19 NOTE — THERAPY DISCHARGE NOTE
Acute Care - Physical Therapy Discharge Summary  [facility]       Patient Name: BRANDON Melchor  : 1928  MRN: [id number]    Today's Date: 2025                 Admit Date: 2025      PT Recommendation and Plan    Visit Dx:    ICD-10-CM ICD-9-CM   1. Cellulitis of buttock  L03.317 682.5   2. Atrial fibrillation with rapid ventricular response  I48.91 427.31   3. Hyponatremia  E87.1 276.1   4. Wound of sacral region  S31.000A 959.19   5. Impaired mobility [Z74.09]  Z74.09 799.89            PT Charges       Row Name 25 1133             Time Calculation    Start Time 1133  -AB      Stop Time 1147  -AB      Time Calculation (min) 14 min  -AB      PT Received On 25  -AB         Time Calculation- PT    Total Timed Code Minutes- PT 14 minute(s)  -AB                User Key  (r) = Recorded By, (t) = Taken By, (c) = Cosigned By      Initials Name Provider Type    Claire Mcqueen, PTA Physical Therapist Assistant                     PT Rehab Goals       Row Name 25 1400             Bed Mobility Goal 1 (PT)    Activity/Assistive Device (Bed Mobility Goal 1, PT) bed mobility activities, all  -AB      [place] Level/Cues Needed (Bed Mobility Goal 1, PT) contact guard required;verbal cues required  -AB      Time Frame (Bed Mobility Goal 1, PT) long term goal (LTG);10 days  -AB      Progress/Outcomes (Bed Mobility Goal 1, PT) goal not met  -AB         Transfer Goal 1 (PT)    Activity/Assistive Device (Transfer Goal 1, PT) transfers, all  -AB      Dare Level/Cues Needed (Transfer Goal 1, PT) standby assist  -AB      Time Frame (Transfer Goal 1, PT) long term goal (LTG);10 days  -AB      Progress/Outcome (Transfer Goal 1, PT) goal not met  -AB         Gait Training Goal 1 (PT)    Activity/Assistive Device (Gait Training Goal 1, PT) gait (walking locomotion);decrease asymmetrical patterns;decrease fall risk;diminish gait deviation;forward stepping;improve balance and  speed;increase endurance/gait distance;increase energy conservation;assistive device use;walker, rolling  -AB      [place] Level (Gait Training Goal 1, PT) minimum assist (75% or more patient effort)  -AB      Distance (Gait Training Goal 1, PT) 25' with no pain reports  -AB      Time Frame (Gait Training Goal 1, PT) long term goal (LTG);10 days  -AB      Progress/Outcome (Gait Training Goal 1, PT) goal partially met  -AB         Balance Goal 1 (PT)    Activity/Assistive Device (Balance Goal) sitting static balance;sitting dynamic balance;sit to stand dynamic balance;standing static balance;standing dynamic balance;with functional activities/occupations;with functional mobility activities;with functional reaching activities;supported;walker, rolling  -AB      [place] Level/Cues Needed (Balance Goal 1, PT) contact guard required  -AB      Time Frame (Balance Goal 1, PT) long-term goal (LTG);by discharge  -AB      Progress/Outcomes (Balance Goal 1, PT) goal not met  -AB         Wound Management Goal 1 (PT)    Wound Management Goal (Wound Goal 1, PT) The pt will hace decrease in wound length from 2.4 cm to 1.4 cm or less  -AB      Time Frame (Wound Goal 1, PT) long-term goal (LTG);by discharge  -AB      Progress/Outcomes (Wound Goal 1, PT) goal not met  -AB         Wound Management Goal 2 (PT)    Wound Management Goal (Wound Goal 2, PT) The pt will hace decrease in wound width from 1.7cm to 1 cm or less as needed to promote wound healing  -AB      Time Frame (Wound Goal 2, PT) long-term goal (LTG);by discharge  -AB      Progress/Outcomes (Wound Goal 2, PT) goal not met  -AB         Wound Management Goal 3 (PT)    Wound Management Goal (Wound Goal 3, PT) The wound bed will appear 100% red as needed to maintain healthy wound environment and return to PLOF  -AB      Time Frame (Wound Goal 3, PT) long-term goal (LTG);by discharge  -AB      Progress/Outcomes (Wound Goal 3, PT) other (see comments)  new goal  -AB                 User Key  (r) = Recorded By, (t) = Taken By, (c) = Cosigned By      Initials Name Provider Type Discipline    AB Claire Wright, PTA Physical Therapist Assistant PT                    Therapy Charges for Today       Code Description Service Date Service Provider Modifiers Qty    [id number]  PT THERAPEUTIC ACT EA 15 MIN 4/19/2025 Claire Wright PTA GP 1            PT Discharge Summary  Anticipated Discharge Disposition (PT): skilled nursing facility  Reason for Discharge: Discharge from facility  Outcomes Achieved: Refer to plan of care for updates on goals achieved  Discharge Destination: SNF      Claire Wright PTA   4/19/2025

## 2025-04-19 NOTE — THERAPY TREATMENT NOTE
Acute Care - Physical Therapy Treatment Note  [facility]     Patient Name: BRANDON Melchor  : 1928  MRN: [id number]  Today's Date: 2025      Visit Dx:     ICD-10-CM ICD-9-CM   1. Cellulitis of buttock  L03.317 682.5   2. Atrial fibrillation with rapid ventricular response  I48.91 427.31   3. Hyponatremia  E87.1 276.1   4. Wound of sacral region  S31.000A 959.19   5. Impaired mobility [Z74.09]  Z74.09 799.89     Patient Active Problem List   Diagnosis    Cellulitis    Wound of sacral region    Abscess, sacrum    Atrial fibrillation    Essential hypertension, benign     Past Medical History:   Diagnosis Date    A-fib     Diabetes mellitus     Glaucoma     Hyperlipidemia     Hypertension      Past Surgical History:   Procedure Laterality Date    HYSTERECTOMY      KNEE SURGERY Right     TOE SURGERY Right     WOUND DEBRIDEMENT N/A 4/15/2025    Procedure: INCISION AND DEBRIDEMENT SACRAL WOUND;  Surgeon: Dav Trevino MD;  Location: [facility];  Service: General;  Laterality: N/A;     PT Assessment (Last 12 Hours)       PT Evaluation and Treatment       Row Name 25 1133          Physical Therapy Time and Intention    Mode of Treatment physical therapy  -AB     Session Not Performed patient/family declined treatment  -AB     Comment, Session Not Performed States she is waiting on a bath first  -AB     Comment Had to paatch w/v  -AB       Row Name 25 1133          Bed Mobility    Rolling Left [place] (Bed Mobility) minimum assist (75% patient effort);verbal cues  -AB       Row Name 25 1133          Wound 04/15/25 1337 coccyx Surgical    Wound - Properties Group Placement Date: 04/15/25  -EP Placement Time:   -EP Present on Original Admission: N  -EP Location: coccyx  -EP Primary Wound Type: Surgical  -EP    Dressing Appearance dressing loose  had to patch  -AB     Wound Output (mL) 240  -AB     Retired Wound - Properties Group Placement Date: 04/15/25  -EP Placement Time:    -EP Present on Original Admission: N  -EP Location: coccyx  -EP    Retired Wound - Properties Group Placement Date: 04/15/25  -EP Placement Time: 1337  -EP Present on Original Admission: N  -EP Location: coccyx  -EP    Retired Wound - Properties Group Date first assessed: 04/15/25  -EP Time first assessed: 1337  -EP Present on Original Admission: N  -EP Location: coccyx  -EP      Row Name 04/19/25 1133          NPWT (Negative Pressure Wound Therapy) 04/16/25 1325    NPWT (Negative Pressure Wound Therapy) - Properties Group Placement Date: 04/16/25  -AJ Placement Time: 1325  -AJ    Therapy Setting continuous therapy  -AB     Pressure Setting 125 mmHg  -AB     Retired NPWT (Negative Pressure Wound Therapy) - Properties Group Placement Date: 04/16/25  -AJ Placement Time: 1325  -AJ    Retired NPWT (Negative Pressure Wound Therapy) - Properties Group Placement Date: 04/16/25  -AJ Placement Time: 1325  -AJ    Retired NPWT (Negative Pressure Wound Therapy) - Properties Group Placement Date: 04/16/25  -AJ Placement Time: 1325  -AJ      Row Name 04/19/25 1133          Positioning and Restraints    Pre-Treatment Position in bed  -AB     Post Treatment Position bed  -AB     In Bed fowlers;call light within reach  -AB               User Key  (r) = Recorded By, (t) = Taken By, (c) = Cosigned By      Initials Name Provider Type    AB Claire Wright, PTA Physical Therapist Assistant    Piyush Snow, RN Registered Nurse    Minor Mcdaniel PT DPT Physical Therapist                    Physical Therapy Education       Title: PT OT SLP Therapies (In Progress)       Topic: Physical Therapy (In Progress)       Point: Mobility training (In Progress)       Learning Progress Summary            Patient Acceptance, E, NR by RB at 4/18/2025 1642    Acceptance, E, VU,NR by MAHI at 4/16/2025 1450    Comment: educated on role and benefit of wound vac, and its healing purposes. wet to dry if wound vac comes off or seal is lost    Acceptance,  E, VU,NR by AJ at 4/16/2025 1046    Comment: role of PT, call for assist, goal setting, d/c planning                      Point: Home exercise program (In Progress)       Learning Progress Summary            Patient Acceptance, E, NR by RB at 4/18/2025 1642    Acceptance, E, VU,NR by AJ at 4/16/2025 1450    Comment: educated on role and benefit of wound vac, and its healing purposes. wet to dry if wound vac comes off or seal is lost    Acceptance, E, VU,NR by AJ at 4/16/2025 1046    Comment: role of PT, call for assist, goal setting, d/c planning                      Point: Body mechanics (In Progress)       Learning Progress Summary            Patient Acceptance, E, NR by RB at 4/18/2025 1642    Acceptance, E, VU,NR by AJ at 4/16/2025 1450    Comment: educated on role and benefit of wound vac, and its healing purposes. wet to dry if wound vac comes off or seal is lost    Acceptance, E, VU,NR by AJ at 4/16/2025 1046    Comment: role of PT, call for assist, goal setting, d/c planning                      Point: Precautions (In Progress)       Learning Progress Summary            Patient Acceptance, E, NR by RB at 4/18/2025 1642    Acceptance, E, VU,NR by AJ at 4/16/2025 1450    Comment: educated on role and benefit of wound vac, and its healing purposes. wet to dry if wound vac comes off or seal is lost    Acceptance, E, VU,NR by AJ at 4/16/2025 1046    Comment: role of PT, call for assist, goal setting, d/c planning                                      User Key       Initials Effective Dates Name Provider Type Discipline    AJ 08/15/24 -  Minor Goss, PT DPT Physical Therapist PT    RB 02/10/25 -  Cortes Cuellar, RN Registered Nurse Nurse                  PT Recommendation and Plan             Time Calculation:    PT Charges       Row Name 04/19/25 1133             Time Calculation    Start Time 1133  -AB      Stop Time 1147  -AB      Time Calculation (min) 14 min  -AB      PT Received On 04/19/25  -AB         Time  Calculation- PT    Total Timed Code Minutes- PT 14 minute(s)  -AB                User Key  (r) = Recorded By, (t) = Taken By, (c) = Cosigned By      Initials Name Provider Type    AB Claire Wright PTA Physical Therapist Assistant                  Therapy Charges for Today       Code Description Service Date Service Provider Modifiers Qty    70471283499 HC PT THERAPEUTIC ACT EA 15 MIN 4/19/2025 Claire Wright PTA GP 1            PT G-Codes  Outcome Measure Options: AM-PAC 6 Clicks Basic Mobility (PT)  AM-PAC 6 Clicks Score (PT): 15  AM-PAC 6 Clicks Score (OT): 16    Claire Wright PTA  4/19/2025

## 2025-04-20 LAB
BACTERIA SPEC AEROBE CULT: ABNORMAL
BACTERIA SPEC AEROBE CULT: NORMAL
BACTERIA SPEC ANAEROBE CULT: ABNORMAL
FUNGUS WND CULT: NORMAL
GRAM STN SPEC: ABNORMAL
QT INTERVAL: 318 MS
QTC INTERVAL: 443 MS

## 2025-04-20 NOTE — THERAPY DISCHARGE NOTE
Acute Care - Occupational Therapy Discharge Summary  [facility]     Patient Name: BRANDON Melchor  : 1928  MRN: [id number]    Today's Date: 2025                 Admit Date: 2025        OT Recommendation and Plan    Visit Dx:    ICD-10-CM ICD-9-CM   1. Cellulitis of buttock  L03.317 682.5   2. Atrial fibrillation with rapid ventricular response  I48.91 427.31   3. Hyponatremia  E87.1 276.1   4. Wound of sacral region  S31.000A 959.19   5. Impaired mobility [Z74.09]  Z74.09 799.89                OT Rehab Goals       Row Name 25 0900             Transfer Goal 1 (OT)    Activity/Assistive Device (Transfer Goal 1, OT) toilet  -CS      [place] Level/Cues Needed (Transfer Goal 1, OT) standby assist  -CS      Time Frame (Transfer Goal 1, OT) long term goal (LTG);10 days  -CS      Progress/Outcome (Transfer Goal 1, OT) goal not met  -CS         Dressing Goal 1 (OT)    Activity/Device (Dressing Goal 1, OT) dressing skills, all  -CS      [place]/Cues Needed (Dressing Goal 1, OT) standby assist  -CS      Time Frame (Dressing Goal 1, OT) long term goal (LTG);10 days  -CS      Progress/Outcome (Dressing Goal 1, OT) goal not met  -CS         Toileting Goal 1 (OT)    Activity/Device (Toileting Goal 1, OT) toileting skills, all  -CS      [place] Level/Cues Needed (Toileting Goal 1, OT) standby assist  -CS      Time Frame (Toileting Goal 1, OT) long term goal (LTG);10 days  -CS      Progress/Outcome (Toileting Goal 1, OT) goal not met  -CS                User Key  (r) = Recorded By, (t) = Taken By, (c) = Cosigned By      Initials Name Provider Type Discipline    CS Jovana Sandhu, OTR/L, CNT Occupational Therapist OT                        Timed Therapy Charges  Total Units: 5      Suggested Charges  Total Units: 5      Procedure Name Documented Minutes Units Code    HC OT SELF CARE/MGMT/TRAIN EA 15 MIN 73 5   12006 (CPT®)                 Documented Minutes  Total Minutes: 73      Therapy  Provided Minutes    35282 - OT Self Care/Mgmt Minutes 73                        OT Discharge Summary  Anticipated Discharge Disposition (OT): skilled nursing facility  Reason for Discharge: Discharge from facility  Outcomes Achieved: Refer to plan of care for updates on goals achieved  Discharge Destination: SNF      Jovana Sandhu, OTR/L, CNT  4/20/2025

## 2025-04-22 LAB — FUNGUS WND CULT: NORMAL

## 2025-04-29 LAB — FUNGUS WND CULT: NORMAL

## 2025-05-02 ENCOUNTER — OFFICE VISIT (OUTPATIENT)
Dept: SURGERY | Facility: CLINIC | Age: OVER 89
End: 2025-05-02
Payer: MEDICARE

## 2025-05-02 VITALS
BODY MASS INDEX: 18.4 KG/M2 | HEART RATE: 80 BPM | OXYGEN SATURATION: 96 % | HEIGHT: 66 IN | SYSTOLIC BLOOD PRESSURE: 103 MMHG | DIASTOLIC BLOOD PRESSURE: 66 MMHG

## 2025-05-02 DIAGNOSIS — L89.90 PRESSURE INJURY OF SKIN, UNSPECIFIED INJURY STAGE, UNSPECIFIED LOCATION: Primary | ICD-10-CM

## 2025-05-02 PROCEDURE — 99024 POSTOP FOLLOW-UP VISIT: CPT | Performed by: STUDENT IN AN ORGANIZED HEALTH CARE EDUCATION/TRAINING PROGRAM

## 2025-05-02 NOTE — PATIENT INSTRUCTIONS
General Surgery Clinic Discharge Instructions      BRANDON Melchor  05/02/25      Diagnosis: Pressure ulcer status postdebridement by Dr. Dva Trevino    Medications/Treatments:   (Take all medications as prescribed by your provider)  Continue wet-to-dry gauze dressings twice daily every day until wound is healed  Continue pressure offloading, pressure relief, patient not to be putting pressure on wound for long periods of time      Surgical Plan: No plans for further surgery      Follow-up: No scheduled follow-up with general surgery office-please call and schedule appointment with any of the available providers if surgical follow-up is required      Please call our office at [phone] with any issues, questions, or concerns.      Joo Caro MD  5/2/2025   11:05 CDT

## 2025-05-02 NOTE — PROGRESS NOTES
"  [facility] General Surgery Clinic Progress Note  BRANDON Melchor  MRN: [id number]  Age: 89 y.o. female   YOB: 1928      SUBJECTIVE  History of Presenting Illness   Patient is a 89 y.o. female who was previously seen as an inpatient by my partner, Dr. Dav Trevino, and underwent focal debridement of a decubitus ulcer at the right ischial tuberosity.  Patient has been undergoing wound care, and presents for routine follow-up and wound check.      Physical Examination     Vitals:    05/02/25 1041   BP: 103/66   Pulse: 80   SpO2: 96%   Height: 167.6 cm (66\")       Estimated body mass index is 18.4 kg/m² as calculated from the following:    Height as of this encounter: 167.6 cm (66\").    Weight as of 4/18/25: 51.7 kg (114 lb).  PREVIOUS WEIGHTS:   Wt Readings from Last 5 Encounters:   04/18/25 51.7 kg (114 lb)       Physical Examination:  Gen: awake, alert, elderly female sitting up in wheelchair in no acute distress  HEENT: NCAT, EOMI, anicteric sclerae  CV: RRR, normotensive  Resp: nonlabored on room air, sats appropriate  INTEG: Site of right sided debridement with packing in place, packing removed and good granulation tissue underneath, no evidence of ongoing infection, no evidence of further tunneling, no evidence of necrosis or purulent drainage.  MSK: moves all four, no c/c/e  Neuro: no focal deficits, cranial nerves grossly intact  Psy:  appropriate, cooperative        Assessment/Plan   BRANDON Melchor is a 89 y.o. female with pressure ulcer status post debridement while inpatient by my partner Dr. Trevino.  Patient is doing well with wound care, recommend twice daily wet-to-dry dressing changes, ongoing pressure offloading until wound heals.  No need for further surgical intervention, no need for surgical follow-up in my office.  If patient is having further wound issues, or wound care concerns, recommend referral to outpatient wound care clinic for assessment, evaluation, and " treatment.        Joo Caro MD  5/2/2025   14:53 CDT

## 2025-05-06 LAB — FUNGUS WND CULT: NORMAL

## 2025-05-13 LAB — FUNGUS WND CULT: NORMAL

## 2025-05-20 LAB — FUNGUS WND CULT: NORMAL

## 2025-05-27 LAB — FUNGUS WND CULT: NORMAL

## 2025-06-03 ENCOUNTER — OFFICE VISIT (OUTPATIENT)
Dept: CARDIOLOGY | Facility: CLINIC | Age: OVER 89
End: 2025-06-03
Payer: MEDICARE

## 2025-06-03 VITALS
SYSTOLIC BLOOD PRESSURE: 100 MMHG | BODY MASS INDEX: 18.4 KG/M2 | HEIGHT: 66 IN | DIASTOLIC BLOOD PRESSURE: 60 MMHG | HEART RATE: 96 BPM

## 2025-06-03 DIAGNOSIS — Z91.81 AT RISK FOR FALLS: ICD-10-CM

## 2025-06-03 DIAGNOSIS — I10 ESSENTIAL HYPERTENSION, BENIGN: Chronic | ICD-10-CM

## 2025-06-03 DIAGNOSIS — Z87.19 HISTORY OF GASTRITIS: ICD-10-CM

## 2025-06-03 DIAGNOSIS — R54 ADVANCED AGE: ICD-10-CM

## 2025-06-03 DIAGNOSIS — I48.19 PERSISTENT ATRIAL FIBRILLATION: Primary | Chronic | ICD-10-CM

## 2025-06-03 RX ORDER — TRAMADOL HYDROCHLORIDE 50 MG/1
50 TABLET ORAL EVERY 6 HOURS PRN
COMMUNITY

## 2025-06-03 RX ORDER — MONTELUKAST SODIUM 10 MG/1
10 TABLET ORAL NIGHTLY
COMMUNITY

## 2025-06-03 NOTE — PROGRESS NOTES
Subjective:     Encounter Date:06/03/2025      Patient ID: Lisa Melchor is a 89 y.o. female who presents to the office today for discharge follow-up after findings of atrial fibrillation during her recent hospitalization.    Chief Complaint: Discharge follow-up  Atrial Fibrillation  Presents for follow-up visit. Symptoms include weakness. Symptoms are negative for bradycardia, chest pain, hemodynamic instability, hypertension, hypotension, palpitations, shortness of breath, syncope and tachycardia. The symptoms have been stable. Past medical history includes atrial fibrillation. There are no medication compliance problems.     Ms. Melchor presents from the skilled nursing facility today for office follow up after recent hospital consultation with cardiology for findings of atrial fibrillation.  She was hospitalized in April after being found down at home.  She was treated for cellulitis and noted to have a sacral wound.  She also was found to be in atrial fibrillation during her hospitalization and medication adjustments were made for improvement of rate control.  She was discharged to the skilled nursing facility for strength rehab and wound management.  She has been feeling well from a cardiac standpoint.  She denies any palpitations, known irregular heartbeats, known episodes of tachycardia.  She continues to work with physical therapy for strengthening.  She states that she is also continuing to have management of her cellulitis.    Given her presentation and the concern for fall and high risk for falls after being found down at home for an unknown period of time anticoagulation was felt to be increased risk for the patient given her presentation as well as history of gastritis.  She reports no recent falls at rehab and denies any strokelike symptoms.    The following portions of the patient's history were reviewed and updated as appropriate: allergies, current medications, past family history,  past medical history, past social history, and past surgical history.    Allergies   Allergen Reactions    Milk-Related Compounds Hives       Current Outpatient Medications:     acetaminophen (TYLENOL) 325 MG tablet, Take 2 tablets by mouth Every 4 (Four) Hours As Needed for Mild Pain., Disp: , Rfl:     bisacodyl (DULCOLAX) 10 MG suppository, Insert 1 suppository into the rectum Daily As Needed for Constipation (Use if bisacodyl oral is ineffective)., Disp: , Rfl:     carvedilol (COREG) 25 MG tablet, Take 1 tablet by mouth 2 (Two) Times a Day With Meals for 30 days., Disp: 60 tablet, Rfl: 0    citalopram (CeleXA) 10 MG tablet, Take 1 tablet by mouth Daily., Disp: , Rfl:     dilTIAZem CD (Cartia XT) 240 MG 24 hr capsule, Take 1 capsule by mouth Daily for 30 days., Disp: 30 capsule, Rfl: 0    dorzolamide-timolol (COSOPT) 2-0.5 % ophthalmic solution, Administer 1 drop to both eyes 2 (Two) Times a Day., Disp: , Rfl:     losartan (COZAAR) 50 MG tablet, Take 1 tablet by mouth Daily., Disp: , Rfl:     montelukast (SINGULAIR) 10 MG tablet, Take 1 tablet by mouth Every Night., Disp: , Rfl:     naloxone (NARCAN) 4 MG/0.1ML nasal spray, Call 911. Don't prime. [place] in 1 nostril for overdose. Repeat in 2-3 minutes in other nostril if no or minimal breathing/responsiveness., Disp: 2 each, Rfl: 0    polyethylene glycol (MIRALAX) 17 g packet, Take 17 g by mouth Daily As Needed (Use if senna-docusate is ineffective)., Disp: , Rfl:     sennosides-docusate (PERICOLACE) 8.6-50 MG per tablet, Take 2 tablets by mouth 2 (Two) Times a Day As Needed for Constipation., Disp: , Rfl:     spironolactone (ALDACTONE) 25 MG tablet, Take 1 tablet by mouth Daily for 30 days., Disp: 30 tablet, Rfl: 0    torsemide (DEMADEX) 10 MG tablet, Take 0.5 tablets by mouth Daily., Disp: , Rfl:     traMADol (ULTRAM) 50 MG tablet, Take 1 tablet by mouth Every 6 (Six) Hours As Needed for Moderate Pain. 1/2 TABLET EVERY 8 HOURS, Disp: , Rfl:         Review of  Systems   Constitutional: Negative for diaphoresis, fever and malaise/fatigue.   Eyes:  Negative for visual disturbance.   Cardiovascular:  Positive for leg swelling. Negative for chest pain, dyspnea on exertion, irregular heartbeat, near-syncope, orthopnea, palpitations, paroxysmal nocturnal dyspnea and syncope.   Respiratory:  Negative for cough, shortness of breath and wheezing.    Hematologic/Lymphatic: Negative for bleeding problem. Bruises/bleeds easily.   Skin:  Positive for poor wound healing.   Musculoskeletal:  Positive for arthritis and neck pain. Negative for back pain.   Gastrointestinal:  Negative for bloating, abdominal pain, nausea and vomiting.   Neurological:  Positive for loss of balance and weakness. Negative for headaches and light-headedness.   Psychiatric/Behavioral:  Negative for altered mental status. The patient is not nervous/anxious.          ECG 12 Lead    Date/Time: 6/3/2025 10:49 AM  Performed by: Claire Jaeger APRN    Authorized by: Claire Jaeger APRN  Comparison: compared with previous ECG from 4/14/2025  Similar to previous ECG  Comparison to previous ECG: Heart rate has decreased by 21 bpm  Rhythm: atrial fibrillation  Rate: normal  BPM: 96  Conduction: conduction normal  QRS axis: normal    Clinical impression: abnormal EKG             Objective:     Vitals reviewed.   Constitutional:       General: Awake.      Appearance: Well-developed, well-groomed and not in distress.   HENT:      Head: Normocephalic and atraumatic.   Pulmonary:      Effort: Pulmonary effort is normal.      Breath sounds: Normal breath sounds.   Cardiovascular:      Normal rate. Irregularly irregular rhythm.   Edema:     Peripheral edema present.  Musculoskeletal:      Cervical back: Normal range of motion and neck supple. Skin:     General: Skin is warm and dry.   Neurological:      Mental Status: Alert, oriented to person, place, and time and oriented to person, place and time.   Psychiatric:          "Attention and Perception: Attention normal.         Mood and Affect: Mood normal.         Speech: Speech normal.         Behavior: Behavior normal. Behavior is cooperative.         Thought Content: Thought content normal.       /60   Pulse 96   Ht 167.6 cm (66\")   BMI 18.40 kg/m²     Lab Review:   Results for orders placed during the hospital encounter of 04/14/25    Adult Transthoracic Echo Complete W/ Cont if Necessary Per Protocol    Interpretation Summary    Left ventricular systolic function is normal. Left ventricular ejection fraction appears to be 56 - 60%.    Left ventricular wall thickness is consistent with mild concentric hypertrophy.    Normal right ventricular cavity size noted. Borderline low right ventricular systolic function noted.    No significant (greater than mild) valvular abnormalities identified on this study.    Lab Results   Component Value Date    WBC 8.92 05/05/2025    HGB 12.2 05/05/2025    HCT 38.3 05/05/2025    MCV 92.5 05/05/2025     05/05/2025     Lab Results   Component Value Date    GLUCOSE 328 (H) 04/21/2025    CALCIUM 8.8 04/21/2025     (L) 04/21/2025    K 4.3 04/21/2025    CO2 22.0 04/21/2025    CL 97 (L) 04/21/2025    BUN 15 04/21/2025    CREATININE 0.46 (L) 04/21/2025    EGFR 87.7 04/21/2025    BCR 32.6 (H) 04/21/2025    ANIONGAP 14.0 04/21/2025           Assessment:          Diagnosis Plan   1. Persistent atrial fibrillation        2. Essential hypertension, benign        3. Advanced age        4. At risk for falls        5. History of gastritis               Plan:       -Persistent atrial fibrillation: Rate controlled.  Continue rate controlling medications.  Does not require long-term follow-up with cardiology unless the patient has new complaints.  Would adjust medications for improved rate control if necessary.    -Hypertension: Well-controlled.    - Advanced age/risk for falls/history of gastritis: Presented as an evaluation in the emergency " department after being found down at home.  [place] to be high risk for bleeding with previous falls and advanced age.  In addition she has had a history of gastritis and so it was felt that the patient would not be managed on anticoagulation for stroke risk reduction.         Continue rate controlling medications as prescribed during her hospitalization.  Follow-up with primary care doctor for ongoing rate management.  Does not require follow-up with cardiology unless needed in future.

## 2025-07-09 ENCOUNTER — LAB REQUISITION (OUTPATIENT)
Dept: LAB | Facility: HOSPITAL | Age: OVER 89
End: 2025-07-09
Payer: MEDICARE

## 2025-07-09 DIAGNOSIS — E11.8 TYPE 2 DIABETES MELLITUS WITH UNSPECIFIED COMPLICATIONS: ICD-10-CM

## 2025-07-09 LAB
ALBUMIN SERPL-MCNC: 3.7 G/DL (ref 3.5–5.2)
ALBUMIN/GLOB SERPL: 1.3 G/DL
ALP SERPL-CCNC: 66 U/L (ref 39–117)
ALT SERPL W P-5'-P-CCNC: 14 U/L (ref 1–33)
ANION GAP SERPL CALCULATED.3IONS-SCNC: 11 MMOL/L (ref 5–15)
AST SERPL-CCNC: 16 U/L (ref 1–32)
BASOPHILS # BLD AUTO: 0.08 10*3/MM3 (ref 0–0.2)
BASOPHILS NFR BLD AUTO: 1 % (ref 0–1.5)
BILIRUB SERPL-MCNC: 0.2 MG/DL (ref 0–1.2)
BUN SERPL-MCNC: 31.8 MG/DL (ref 8–23)
BUN/CREAT SERPL: 44.2 (ref 7–25)
CALCIUM SPEC-SCNC: 9 MG/DL (ref 8.2–9.6)
CHLORIDE SERPL-SCNC: 101 MMOL/L (ref 98–107)
CHOLEST SERPL-MCNC: 159 MG/DL (ref 0–200)
CO2 SERPL-SCNC: 24 MMOL/L (ref 22–29)
CREAT SERPL-MCNC: 0.72 MG/DL (ref 0.57–1)
DEPRECATED RDW RBC AUTO: 50.7 FL (ref 37–54)
EGFRCR SERPLBLD CKD-EPI 2021: 76.2 ML/MIN/1.73
EOSINOPHIL # BLD AUTO: 0.09 10*3/MM3 (ref 0–0.4)
EOSINOPHIL NFR BLD AUTO: 1.1 % (ref 0.3–6.2)
ERYTHROCYTE [DISTWIDTH] IN BLOOD BY AUTOMATED COUNT: 14.7 % (ref 12.3–15.4)
GLOBULIN UR ELPH-MCNC: 2.9 GM/DL
GLUCOSE SERPL-MCNC: 135 MG/DL (ref 65–99)
HBA1C MFR BLD: 7 % (ref 4.8–5.6)
HCT VFR BLD AUTO: 37.5 % (ref 34–46.6)
HDLC SERPL-MCNC: 34 MG/DL (ref 40–60)
HGB BLD-MCNC: 12.4 G/DL (ref 12–15.9)
IMM GRANULOCYTES # BLD AUTO: 0.02 10*3/MM3 (ref 0–0.05)
IMM GRANULOCYTES NFR BLD AUTO: 0.2 % (ref 0–0.5)
LDLC SERPL CALC-MCNC: 80 MG/DL (ref 0–100)
LDLC/HDLC SERPL: 2.09 {RATIO}
LYMPHOCYTES # BLD AUTO: 3.49 10*3/MM3 (ref 0.7–3.1)
LYMPHOCYTES NFR BLD AUTO: 42 % (ref 19.6–45.3)
MCH RBC QN AUTO: 30.8 PG (ref 26.6–33)
MCHC RBC AUTO-ENTMCNC: 33.1 G/DL (ref 31.5–35.7)
MCV RBC AUTO: 93.1 FL (ref 79–97)
MONOCYTES # BLD AUTO: 0.83 10*3/MM3 (ref 0.1–0.9)
MONOCYTES NFR BLD AUTO: 10 % (ref 5–12)
NEUTROPHILS NFR BLD AUTO: 3.8 10*3/MM3 (ref 1.7–7)
NEUTROPHILS NFR BLD AUTO: 45.7 % (ref 42.7–76)
NRBC BLD AUTO-RTO: 0 /100 WBC (ref 0–0.2)
PLATELET # BLD AUTO: 190 10*3/MM3 (ref 140–450)
PMV BLD AUTO: 10.9 FL (ref 6–12)
POTASSIUM SERPL-SCNC: 5.1 MMOL/L (ref 3.5–5.2)
PROT SERPL-MCNC: 6.6 G/DL (ref 6–8.5)
RBC # BLD AUTO: 4.03 10*6/MM3 (ref 3.77–5.28)
SODIUM SERPL-SCNC: 136 MMOL/L (ref 136–145)
T4 FREE SERPL-MCNC: 0.92 NG/DL (ref 0.92–1.68)
TRIGL SERPL-MCNC: 270 MG/DL (ref 0–150)
TSH SERPL DL<=0.05 MIU/L-ACNC: 1.77 UIU/ML (ref 0.27–4.2)
VLDLC SERPL-MCNC: 45 MG/DL (ref 5–40)
WBC NRBC COR # BLD AUTO: 8.31 10*3/MM3 (ref 3.4–10.8)

## 2025-07-09 PROCEDURE — 80053 COMPREHEN METABOLIC PANEL: CPT | Performed by: STUDENT IN AN ORGANIZED HEALTH CARE EDUCATION/TRAINING PROGRAM

## 2025-07-09 PROCEDURE — 83036 HEMOGLOBIN GLYCOSYLATED A1C: CPT | Performed by: STUDENT IN AN ORGANIZED HEALTH CARE EDUCATION/TRAINING PROGRAM

## 2025-07-09 PROCEDURE — 84443 ASSAY THYROID STIM HORMONE: CPT | Performed by: STUDENT IN AN ORGANIZED HEALTH CARE EDUCATION/TRAINING PROGRAM

## 2025-07-09 PROCEDURE — 84439 ASSAY OF FREE THYROXINE: CPT | Performed by: STUDENT IN AN ORGANIZED HEALTH CARE EDUCATION/TRAINING PROGRAM

## 2025-07-09 PROCEDURE — 80061 LIPID PANEL: CPT | Performed by: STUDENT IN AN ORGANIZED HEALTH CARE EDUCATION/TRAINING PROGRAM

## 2025-07-09 PROCEDURE — 85025 COMPLETE CBC W/AUTO DIFF WBC: CPT | Performed by: STUDENT IN AN ORGANIZED HEALTH CARE EDUCATION/TRAINING PROGRAM

## 2025-08-21 ENCOUNTER — OFFICE VISIT (OUTPATIENT)
Age: OVER 89
End: 2025-08-21
Payer: MEDICARE

## 2025-08-21 VITALS
HEART RATE: 103 BPM | DIASTOLIC BLOOD PRESSURE: 58 MMHG | SYSTOLIC BLOOD PRESSURE: 104 MMHG | WEIGHT: 114 LBS | OXYGEN SATURATION: 96 % | BODY MASS INDEX: 18.32 KG/M2 | HEIGHT: 66 IN

## 2025-08-21 DIAGNOSIS — L60.3 NAIL DYSTROPHY: ICD-10-CM

## 2025-08-21 DIAGNOSIS — E11.9 ENCOUNTER FOR DIABETIC FOOT EXAM: ICD-10-CM

## 2025-08-21 DIAGNOSIS — M20.11 VALGUS DEFORMITY OF BOTH GREAT TOES: ICD-10-CM

## 2025-08-21 DIAGNOSIS — R54 ADVANCED AGE: ICD-10-CM

## 2025-08-21 DIAGNOSIS — R26.9 GAIT ABNORMALITY: ICD-10-CM

## 2025-08-21 DIAGNOSIS — E11.9 TYPE 2 DIABETES MELLITUS WITHOUT COMPLICATION, WITHOUT LONG-TERM CURRENT USE OF INSULIN: ICD-10-CM

## 2025-08-21 DIAGNOSIS — M20.12 VALGUS DEFORMITY OF BOTH GREAT TOES: ICD-10-CM

## 2025-08-21 DIAGNOSIS — L60.2 THICKENED NAIL: Primary | ICD-10-CM

## 2025-08-21 PROBLEM — F41.1 GENERALIZED ANXIETY DISORDER: Status: ACTIVE | Noted: 2025-06-26

## 2025-08-21 RX ORDER — MELOXICAM 7.5 MG/1
TABLET ORAL
COMMUNITY

## 2025-08-21 RX ORDER — CARVEDILOL 25 MG/1
25 TABLET ORAL 2 TIMES DAILY
COMMUNITY

## (undated) DEVICE — CVR BRD ARM 13X30

## (undated) DEVICE — 4-PORT MANIFOLD: Brand: NEPTUNE 2

## (undated) DEVICE — GLV SURG SENSICARE W/ALOE PF LF 7.5 STRL

## (undated) DEVICE — PATIENT RETURN ELECTRODE, SINGLE-USE, CONTACT QUALITY MONITORING, ADULT, WITH 9FT CORD, FOR PATIENTS WEIGING OVER 33LBS. (15KG): Brand: MEGADYNE

## (undated) DEVICE — 3M™ IOBAN™ 2 ANTIMICROBIAL INCISE DRAPE 6650EZ: Brand: IOBAN™ 2

## (undated) DEVICE — TRAP FLD MINIVAC MEGADYNE 100ML

## (undated) DEVICE — PAD MINOR UNIVERSAL: Brand: MEDLINE INDUSTRIES, INC.

## (undated) DEVICE — SYR LUERLOK 20CC BX/50

## (undated) DEVICE — ELECTRD BLD EZ CLN MOD XLNG 2.75IN

## (undated) DEVICE — TOWEL,OR,DSP,ST,BLUE,STD,4/PK,20PK/CS: Brand: MEDLINE